# Patient Record
Sex: FEMALE | Race: WHITE
[De-identification: names, ages, dates, MRNs, and addresses within clinical notes are randomized per-mention and may not be internally consistent; named-entity substitution may affect disease eponyms.]

---

## 2019-11-01 ENCOUNTER — HOSPITAL ENCOUNTER (INPATIENT)
Dept: HOSPITAL 25 - SSU | Age: 79
LOS: 4 days | Discharge: NURSING, REHAB OR LONG-TERM CARE | DRG: 470 | End: 2019-11-05
Attending: INTERNAL MEDICINE | Admitting: INTERNAL MEDICINE
Payer: MEDICARE
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
## 2019-11-01 DIAGNOSIS — Z88.0: ICD-10-CM

## 2019-11-01 DIAGNOSIS — Z90.710: ICD-10-CM

## 2019-11-01 DIAGNOSIS — J44.9: ICD-10-CM

## 2019-11-01 DIAGNOSIS — Z90.722: ICD-10-CM
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
## 2019-11-01 DIAGNOSIS — Z66: ICD-10-CM

## 2019-11-01 DIAGNOSIS — Z87.891: ICD-10-CM

## 2019-11-01 DIAGNOSIS — S72.001A: Primary | ICD-10-CM

## 2019-11-01 DIAGNOSIS — I10: ICD-10-CM

## 2019-11-01 DIAGNOSIS — E87.6: ICD-10-CM

## 2019-11-01 DIAGNOSIS — Z88.2: ICD-10-CM

## 2019-11-01 DIAGNOSIS — D72.829: ICD-10-CM

## 2019-11-01 DIAGNOSIS — Z91.048: ICD-10-CM

## 2019-11-01 DIAGNOSIS — D64.9: ICD-10-CM

## 2019-11-01 PROCEDURE — 72170 X-RAY EXAM OF PELVIS: CPT

## 2019-11-01 PROCEDURE — 86901 BLOOD TYPING SEROLOGIC RH(D): CPT

## 2019-11-01 PROCEDURE — 81015 MICROSCOPIC EXAM OF URINE: CPT

## 2019-11-01 PROCEDURE — 88311 DECALCIFY TISSUE: CPT

## 2019-11-01 PROCEDURE — 85730 THROMBOPLASTIN TIME PARTIAL: CPT

## 2019-11-01 PROCEDURE — 85025 COMPLETE CBC W/AUTO DIFF WBC: CPT

## 2019-11-01 PROCEDURE — 85610 PROTHROMBIN TIME: CPT

## 2019-11-01 PROCEDURE — 87086 URINE CULTURE/COLONY COUNT: CPT

## 2019-11-01 PROCEDURE — 80048 BASIC METABOLIC PNL TOTAL CA: CPT

## 2019-11-01 PROCEDURE — 88305 TISSUE EXAM BY PATHOLOGIST: CPT

## 2019-11-01 PROCEDURE — 86900 BLOOD TYPING SEROLOGIC ABO: CPT

## 2019-11-01 PROCEDURE — 36415 COLL VENOUS BLD VENIPUNCTURE: CPT

## 2019-11-01 PROCEDURE — C1776 JOINT DEVICE (IMPLANTABLE): HCPCS

## 2019-11-01 PROCEDURE — 93005 ELECTROCARDIOGRAM TRACING: CPT

## 2019-11-01 PROCEDURE — 81003 URINALYSIS AUTO W/O SCOPE: CPT

## 2019-11-01 PROCEDURE — 86850 RBC ANTIBODY SCREEN: CPT

## 2019-11-02 LAB
ANION GAP SERPL CALC-SCNC: 7 MMOL/L (ref 2–11)
APTT PPP: 53.4 SECONDS (ref 26–38)
BASOPHILS # BLD AUTO: 0 10^3/UL (ref 0–0.2)
BUN SERPL-MCNC: 13 MG/DL (ref 6–24)
BUN/CREAT SERPL: 20.3 (ref 8–20)
CALCIUM SERPL-MCNC: 8.6 MG/DL (ref 8.6–10.3)
CHLORIDE SERPL-SCNC: 105 MMOL/L (ref 101–111)
EOSINOPHIL # BLD AUTO: 0 10^3/UL (ref 0–0.6)
GLUCOSE SERPL-MCNC: 76 MG/DL (ref 70–100)
HCO3 SERPL-SCNC: 25 MMOL/L (ref 22–32)
HCT VFR BLD AUTO: 30 % (ref 35–47)
HGB BLD-MCNC: 10.1 G/DL (ref 12–16)
INR PPP/BLD: 1.28 (ref 0.82–1.09)
LYMPHOCYTES # BLD AUTO: 1.5 10^3/UL (ref 1–4.8)
MCH RBC QN AUTO: 29 PG (ref 27–31)
MCHC RBC AUTO-ENTMCNC: 34 G/DL (ref 31–36)
MCV RBC AUTO: 87 FL (ref 80–97)
MONOCYTES # BLD AUTO: 0.9 10^3/UL (ref 0–0.8)
NEUTROPHILS # BLD AUTO: 8.7 10^3/UL (ref 1.5–7.7)
NRBC # BLD AUTO: 0 10^3/UL
NRBC BLD QL AUTO: 0
PLATELET # BLD AUTO: 357 10^3/UL (ref 150–450)
POTASSIUM SERPL-SCNC: 3.4 MMOL/L (ref 3.5–5)
RBC # BLD AUTO: 3.45 10^6 /UL (ref 3.7–4.87)
SODIUM SERPL-SCNC: 137 MMOL/L (ref 135–145)
WBC # BLD AUTO: 11.2 10^3/UL (ref 3.5–10.8)

## 2019-11-02 PROCEDURE — 0SRR01A REPLACEMENT OF RIGHT HIP JOINT, FEMORAL SURFACE WITH METAL SYNTHETIC SUBSTITUTE, UNCEMENTED, OPEN APPROACH: ICD-10-PCS | Performed by: ORTHOPAEDIC SURGERY

## 2019-11-02 RX ADMIN — SODIUM CHLORIDE SCH MLS/HR: 900 IRRIGANT IRRIGATION at 14:56

## 2019-11-02 RX ADMIN — ACETAMINOPHEN PRN MG: 325 TABLET ORAL at 14:58

## 2019-11-02 RX ADMIN — LISINOPRIL SCH MG: 5 TABLET ORAL at 18:06

## 2019-11-02 RX ADMIN — ACETAMINOPHEN PRN MG: 325 TABLET ORAL at 21:11

## 2019-11-02 RX ADMIN — CEFAZOLIN SODIUM SCH MLS/HR: 1 SOLUTION INTRAVENOUS at 18:07

## 2019-11-02 RX ADMIN — POLYETHYLENE GLYCOL 3350 SCH: 17 POWDER, FOR SOLUTION ORAL at 14:57

## 2019-11-02 RX ADMIN — DOCUSATE SODIUM SCH MG: 100 CAPSULE, LIQUID FILLED ORAL at 21:08

## 2019-11-02 RX ADMIN — SODIUM CHLORIDE SCH MLS/HR: 900 IRRIGANT IRRIGATION at 02:28

## 2019-11-02 NOTE — HP
HISTORY AND PHYSICAL:

 

DATE OF ADMISSION:  11/02/19

 

CHIEF COMPLAINT:  Fall with right hip pain.

 

HISTORY OF PRESENT ILLNESS:  This is a 79-year-old female with no past medical 
history other than some COPD, but currently not on any medication, was sent 
from [facility] after she was noted to have a right hip fracture.  The 
patient was in her usual state of health up until 11/01/19 evening when she 
slipped on the linoleum floor in her kitchen, landing on her right hip with an 
instant pain.  She was unable to stand up, so the grandson Vinny lifted her and 
put her in the couch, and later, he was able to bring her in the car and drive 
her to the [facility].  The patient was in severe pain ever since the 
fall.  She denied any head trauma or any loss of consciousness, any dizziness 
prior to the fall, any chest pain, shortness of breath, abdominal pain, nausea, 
vomiting, or diarrhea.  Her main complaint was the pain in the right hip.

 

PAST MEDICAL HISTORY:  She does have a history of COPD, but currently not on 
any medication.  According to the GARRY Tamayo at [facility], the 
patient has had chronic leukocytosis with her WBC always being in the 16,000 to 
18,000 range in all her previous blood work at [facility].

 

PAST SURGICAL HISTORY:  She has a history of exploratory laparotomy, unsure as 
to why it was done.  She has also had total abdominal hysterectomy with 
bilateral salpingo-oophorectomy.

 

HOME MEDICATIONS:  Currently, does not take any medications.  Was given some 
Aleve and Tylenol today after the fall.

 

ALLERGIES:  The patient is documented to be allergic to MERCURY, PENICILLIN, 
and SULFA ANTIBIOTICS which gives rash.

 

FAMILY HISTORY:  Given her age of 79, it is noncontributory.

 

SOCIAL HISTORY:  She quit smoking 20 years ago.  Prior to that, used to smoke 1 
to 2 cigarettes a day for 40 years.  Denies any alcohol or drug use.  Used to 
live independently, but recently moved in a few years ago at her grand kid's 
place.  She has otherwise already signed a DNR and states that her family would 
be the surrogate decision maker.  When asked to specify a name, she states her 
grandson Vinny would be able to make any decisions on her behalf.

 

                               PHYSICAL EXAMINATION

 

GENERAL:  The patient is awake, alert, and oriented x3, did not appear to be in 
any acute respiratory distress.

 

VITAL SIGNS:  Temperature 98.9, /79, heart rate 82, respiration rate 16, 
saturating 96% on room air.

 

HEAD AND NECK:  Atraumatic, normocephalic.  Extraocular movement intact.  Oral 
mucosa was dry.  Neck:  Supple.  No jugular venous distention.

 

LUNGS:  Clear to auscultation bilaterally.  No wheezing, rhonchi, or rales.

 

HEART:  S1, S2.  Regular rate and rhythm.

 

ABDOMEN:  Soft, nontender, nondistended.

 

EXTREMITIES:  The patient does have a shortened right lower extremity without 
any edema.

 

 DIAGNOSTIC STUDIES/LAB DATA:  CBC shows elevated white count as mentioned at 18
,000; hemoglobin shows hemoglobin of 11.8, minimally decreased; platelet count 
was noted to be 449.  LFTs within normal limits.  INR was noted to be 1.12.  
PTT was noted to be 39.2.  Basic metabolic panel unremarkable.  Random glucose 
was noted to be minimally elevated at 116.

 

Hip images showed right hip fracture, documented as a subcapital fracture with 
elevation of the shaft with respect to femoral head, no subluxation.  Left hip 
was showing no fracture.  A 2-view x-ray was read as no acute disease.  EKG 
showed sinus rhythm with voltage criteria for LVH, but this could be just 
because of the patient's body habitus being very skinny, no other ST elevations 
were noted.

 

IMPRESSION:

1.  This is a 79-year-old female with no significant past medical history 
except for some chronic leukocytosis, came in after sustaining a fall, noted to 
have right hip fracture.  Ortho to be consulted.  We will keep the patient 
n.p.o. for possible surgery.  The patient otherwise does not have any risk 
factors for preop clearance such as any previous myocardial infarctions, stroke
, high blood pressure, or diabetes, although the patient does have acutely 
elevated blood pressure.

2.  Elevated blood pressure.  Unclear if this is secondary to pain.  We will 
start the patient on analgesics and also p.r.n. hydralazine and oral amlodipine 
to control the blood pressure.

3.  DVT prophylaxis:  To be started once the patient is evaluated for surgery.

4.  Code status:  The patient is a DNR and suggests her grandson Vinny being the 
healthcare proxy.

 

 

 

[id number]/[facility] #: [id number]

[facility]

## 2019-11-02 NOTE — OP
OPERATIVE REPORT:

 

DATE OF OPERATION:  11/02/19

 

DATE OF BIRTH:  [birth year]

 

SURGEON:  Maximino English MD

 

ASSISTANT:  GARRY Dunn

 

ANESTHESIA:  General endotracheal.

 

PRE-OP DIAGNOSIS:  Displaced right femoral neck fracture.

 

POST-OP DIAGNOSIS:  Displaced right femoral neck fracture.

 

OPERATIVE PROCEDURE:  Right hip hemiarthroplasty.

 

INDICATIONS:  Ms. Branch is a 79-year-old female who had fallen at home last 
night. She initially was brought to [place] and then transferred here in the 
early a.m. hours.  She has a history of CLL and COPD, but otherwise had been 
declared healthy for surgery.  I discussed with her that a right hip 
hemiarthroplasty should work well, so she could put weight on this right away 
and not have to convalesce while a fracture tries to heal.  Risks of surgery 
such as infection, scar formation, stiffness, DVT, leg-length discrepancy, 
instability, hardware failure and continued pain were some of the risks 
discussed.  She had wished to proceed.

 

ESTIMATED BLOOD LOSS:  50 cc.

 

COMPLICATIONS:  None.

 

HARDWARE:  Brad #6 M/L taper, reduced neck with standard offset, -3.5 mm, 28 
mm femoral head, 44 mm bipolar head.

 

DESCRIPTION OF PROCEDURE:  The patient was brought to the OR and general 
endotracheal anesthesia was established.  She was then rolled on to her left 
lateral decubitus position and an axillary roll was placed.  Pegboard had been 
placed and the pegs were used to make sure she was safely secured to the table. 
Right hip area was prepped and then draped.  Skin over the incisional area was 
infiltrated using a 50:50 mixture of 0.5% Marcaine mixed with 1% lidocaine with 
epinephrine.  Incision was made, centered over the greater trochanter, 
extending distally for about 6 to 8 cm and proximally in a gentle curve for 
about 4 to 5 cm. It had been made so that when the hip was flexed up, incision 
would be in a straight line.  Incision was carried down through the skin and 
thin subcutaneous tissues.  Small bleeders encountered were ligated using 
electrocautery.  Fascia was exposed and sharply incised.  Short external 
rotators and abductors were immediately evident.  Hohmann was placed under the 
abductors and nice exposure of the piriformis and back side of the hip was 
obtained.  Electrocautery was used to take down the short external rotators and 
piriformis as well as open the capsule. Fracture was immediately evident.  
Guide was placed over the femur and the femoral neck was marked and then a 
cleanup cut was taken.  Femur was then shifted anteriorly and fracture of the 
head was clearly evident.  Corkscrew was placed and then used to leaver out the 
femoral head and the acetabulum was swept for additional loose bodies.  Pulse 
lavage was also extensively used.  Head slid perfectly through the 44 sizer and 
she was trialed with a 43, 44 and 45 size heads and I like the 44 the best.  
This corresponded well to pre-operative templating.  Attention was turned to 
the proximal femur.  Box osteotome was used to open the femoral canal and the 
canal finder was easily passed.  5 broelisa was seated and she was then trialed 
with a standard neck as well as with a minus head.  She seemed a bit long as 
she was very tight in extension.  Leg length wise, she seemed just about where 
she needed to be.  When the head was removed, the 5 was however just a little 
bit loose.  She was then trialed with a 6 and again I had the same concerns 
about her leg length.  The 6, however, did seem to fit better, but sat a little 
bit proud.  5 broach was then used to countersink a little bit and then the 6 
could be countersunk just a little bit more.  An additional 2 mm was obtained 
this way. She was again trialed and at this time, the broach did not loosen and 
a 6 reduced neck standard offset stem was called for.  This was then impacted 
into place.  -3.5 mm 28 mm head was then impacted in and the bipolar cup 
assembly was assembled on the top side of the head.  She was easily reduced and 
had excellent stability.  I was still a little concerned about the tightness 
she had with extension, but considering the center of rotation it appeared to 
correspond well to the tip of the greater trochanter and that we are unable to 
place any shorter implants, this should work well for her.  Hip was copiously 
pulse lavaged.  20 cc of local was injected on the superiorly and anteriorly 
about the hip as well.  Posterior capsule and posterior short external rotators 
and piriformis were all repaired together to the posterior aspect of the 
greater trochanter.  Hip was again pulse lavaged and fascia was repaired using 
interrupted #1 Vicryl sutures.  Subcutaneous tissues were reapproximated with 2-
0 Vicryl.  Skin was closed using staples.  Sterile dressing was applied in the 
OR.  The patient was rolled on to the hospital and extubated in the OR.  She 
was then stable on transfer to the recovery room.

 

 [id number]/[facility] #: [id number]

RENE

## 2019-11-02 NOTE — PN
Subjective


Date of Service: 11/02/19


Interval History: 





Patient c/o right hip pain 10/10 at time of evaluation and is quite calm during 

history taking. She states when she receives morphine it does help. She 

explains she has felt cold but denies chills and fevers. Denies chest pain, 

difficulty breathing, abd pain, nausea. 





Objective


Active Medications: 








Amlodipine Besylate (Norvasc Tab*)  5 mg PO DAILY [facility]


   Last Admin: 11/02/19 08:29 Dose:  Not Given


Hydralazine HCl (Apresoline Iv*)  5 mg IV SLOW PU Q6H PRN


   PRN Reason: Systolic Bp Greater Than:160


Sodium Chloride (Ns 0.9% 1000 Ml**)  1,000 mls @ 75 mls/hr IV PER RATE [facility]


   Last Admin: 11/02/19 02:28 Dose:  75 mls/hr


Morphine Sulfate (Morphine Inj (Syringe))*)  2 mg IV Q4H PRN


   PRN Reason: PAIN - MILD


   Last Admin: 11/02/19 02:37 Dose:  2 mg








 Vital Signs - 8 hr











  11/02/19 11/02/19 11/02/19





  02:37 03:38 03:47


 


Temperature  98.7 F 


 


Pulse Rate  76 


 


Respiratory 16 16 16





Rate   


 


Blood Pressure  149/75 





(mmHg)   


 


O2 Sat by Pulse  95 





Oximetry   














  11/02/19 11/02/19





  07:28 08:00


 


Temperature 99.2 F 


 


Pulse Rate 92 


 


Respiratory 16 16





Rate  


 


Blood Pressure 145/68 





(mmHg)  


 


O2 Sat by Pulse 94 





Oximetry  











Oxygen Devices in Use Now: None


Appearance: Thin, elderly white female, laying in bed, appearing comfortable 

and in NAD


Eyes: No Scleral Icterus, - - PERRL


Ears/Nose/Mouth/Throat: Mucous Membranes Moist


Neck: - - neck supple


Respiratory: Symmetrical Chest Expansion and Respiratory Effort, Clear to 

Auscultation


Cardiovascular: NL Sounds; No Murmurs; No JVD, RRR


Abdominal: - - abd soft, nontender, nondistended


Extremities: No Edema, No Clubbing, Cyanosis, - - no calf tenderness


Skin: No Rash or Ulcers


Neurological: Alert and Oriented x 3, - - sensation grossly intact and equal in 

bilateral LEs; able to flex/extend all toes and bilaterally dorsiflex/

plantarflex


Result Diagrams: 


 11/02/19 06:21





 11/02/19 06:21





Assess/Plan/Problems-Billing


Assessment: 





80 yo white female with PMHx COPD not on therapy at home presents to the 

[facility] ED right right hip pain s/p mechanical fall, then transferred 

to [facility] when found to have right hip fx.





- Patient Problems


(1) Hip fracture, right


Current Visit: Yes   Status: Acute   Code(s): S72.001A - FRACTURE OF UNSP PART 

OF NECK OF RIGHT FEMUR, INIT   SNOMED Code(s): 181824946


   Comment: 


-displaced right femoral neck fx demonstrated on x-rays at [place]


-Dr. English performed right hemiarthroplasty today. Appreciate ortho consult


-continue pain mgmt, bowel regimen   





(2) Hypertension


Current Visit: Yes   Status: Acute   Code(s): I10 - ESSENTIAL (PRIMARY) 

HYPERTENSION   SNOMED Code(s): 09953262


   Comment: 


-pt does not have pmhx HTN


-possibly related to pain as it was present in ED and PACU postoperatively


-did not receive amlodipine prior to surgery, will change to lisinopril and 

continue to monitor


-continue prn hydralazine for SBP>180   





(3) Hypokalemia


Current Visit: Yes   Status: Acute   Code(s): E87.6 - HYPOKALEMIA   SNOMED Code(

s): 80218380


   Comment: 


-likely 2/2 IVF due to NPO status for surgery


-replacing and will repeat BMP   





(4) COPD (chronic obstructive pulmonary disease)


Current Visit: Yes   Status: Acute   Code(s): J44.9 - CHRONIC OBSTRUCTIVE 

PULMONARY DISEASE, UNSPECIFIED   SNOMED Code(s): 97771599


   Comment: 


-no home meds


-prn albuterol ordered   





(5) Full code status


Current Visit: Yes   Status: Acute   Code(s): Z78.9 - OTHER SPECIFIED HEALTH 

STATUS   SNOMED Code(s): 345023547


   





(6) DVT prophylaxis


Current Visit: Yes   Status: Acute   Code(s): Z29.9 - ENCOUNTER FOR 

PROPHYLACTIC MEASURES, UNSPECIFIED   SNOMED Code(s): 766635813


   Comment: 


-lovenox per ortho

## 2019-11-02 NOTE — PN
Progress Note





- Progress Note


Date of Service: 11/02/19


SOAP: 


Subjective:  80 yo female, h/o COPD asnd CLL, fell at home last night.  Lives 

with her grandchildren.  Initially brought to [place], transferred here for 

definitive management.  Reports her hip hurts, but the pain medications have 

helped.  Other than the right hip, no other complaints.





Objective:  Elderly female, sleeping in bed.  Easily arousable.


VSS: afebrile.  HR, BPs good


CV: S1/S2


Lungs: clear


Right LE: shortened, but not particularly externally rotated.  Being propped by 

pillows though.  Motion not tested.


Good sensation over FDWS and dorum foot.


Can easily wiggle toes, slightly move ankle into plantar and dosriflexion





X-rays:  show a displaced right femoral neck fx





Assessment:  Displaced right femoral neck fx





Plan:  D/w patient a right hemiarthroplastty.  Risks of surgery such as bleeding

, considering her increased PT/INR, infection, scar formation, leg length 

discrepancy, instability, continued pain, DVT/PE and hardware failure, she 

wishes to proceed.  Added on for a right hip hemiarthroplasty later this AM.

## 2019-11-03 LAB
ANION GAP SERPL CALC-SCNC: 5 MMOL/L (ref 2–11)
BASOPHILS # BLD AUTO: 0 10^3/UL (ref 0–0.2)
BUN SERPL-MCNC: 11 MG/DL (ref 6–24)
BUN/CREAT SERPL: 14.1 (ref 8–20)
CALCIUM SERPL-MCNC: 8.9 MG/DL (ref 8.6–10.3)
CHLORIDE SERPL-SCNC: 105 MMOL/L (ref 101–111)
EOSINOPHIL # BLD AUTO: 0 10^3/UL (ref 0–0.6)
GLUCOSE SERPL-MCNC: 91 MG/DL (ref 70–100)
HCO3 SERPL-SCNC: 25 MMOL/L (ref 22–32)
HCT VFR BLD AUTO: 29 % (ref 35–47)
HGB BLD-MCNC: 9.8 G/DL (ref 12–16)
LYMPHOCYTES # BLD AUTO: 2 10^3/UL (ref 1–4.8)
MCH RBC QN AUTO: 30 PG (ref 27–31)
MCHC RBC AUTO-ENTMCNC: 34 G/DL (ref 31–36)
MCV RBC AUTO: 88 FL (ref 80–97)
MONOCYTES # BLD AUTO: 1.2 10^3/UL (ref 0–0.8)
NEUTROPHILS # BLD AUTO: 8.8 10^3/UL (ref 1.5–7.7)
NRBC # BLD AUTO: 0 10^3/UL
NRBC BLD QL AUTO: 0
PLATELET # BLD AUTO: 420 10^3/UL (ref 150–450)
POTASSIUM SERPL-SCNC: 4.1 MMOL/L (ref 3.5–5)
RBC # BLD AUTO: 3.31 10^6 /UL (ref 3.7–4.87)
SODIUM SERPL-SCNC: 135 MMOL/L (ref 135–145)
WBC # BLD AUTO: 12.2 10^3/UL (ref 3.5–10.8)
WBC UR QL AUTO: (no result)

## 2019-11-03 RX ADMIN — APIXABAN SCH MG: 2.5 TABLET, FILM COATED ORAL at 08:15

## 2019-11-03 RX ADMIN — POLYETHYLENE GLYCOL 3350 SCH: 17 POWDER, FOR SOLUTION ORAL at 08:15

## 2019-11-03 RX ADMIN — HYDROCODONE BITARTRATE AND ACETAMINOPHEN PRN TAB: 5; 325 TABLET ORAL at 08:15

## 2019-11-03 RX ADMIN — LISINOPRIL SCH MG: 5 TABLET ORAL at 08:14

## 2019-11-03 RX ADMIN — CEFAZOLIN SODIUM SCH MLS/HR: 1 SOLUTION INTRAVENOUS at 01:08

## 2019-11-03 RX ADMIN — CEFAZOLIN SODIUM SCH MLS/HR: 1 SOLUTION INTRAVENOUS at 10:21

## 2019-11-03 RX ADMIN — HYDROCODONE BITARTRATE AND ACETAMINOPHEN PRN TAB: 5; 325 TABLET ORAL at 12:56

## 2019-11-03 RX ADMIN — HYDROCODONE BITARTRATE AND ACETAMINOPHEN PRN TAB: 5; 325 TABLET ORAL at 17:36

## 2019-11-03 RX ADMIN — HYDROCODONE BITARTRATE AND ACETAMINOPHEN PRN TAB: 5; 325 TABLET ORAL at 00:16

## 2019-11-03 RX ADMIN — HYDROCODONE BITARTRATE AND ACETAMINOPHEN PRN TAB: 5; 325 TABLET ORAL at 03:57

## 2019-11-03 RX ADMIN — DOCUSATE SODIUM SCH MG: 100 CAPSULE, LIQUID FILLED ORAL at 21:42

## 2019-11-03 RX ADMIN — APIXABAN SCH MG: 2.5 TABLET, FILM COATED ORAL at 21:42

## 2019-11-03 RX ADMIN — DOCUSATE SODIUM SCH MG: 100 CAPSULE, LIQUID FILLED ORAL at 08:14

## 2019-11-03 RX ADMIN — HYDROCODONE BITARTRATE AND ACETAMINOPHEN PRN TAB: 5; 325 TABLET ORAL at 21:42

## 2019-11-03 NOTE — PN
Progress Note





- Progress Note


Date of Service: 11/03/19


SOAP: 


Subjective:


OOB to chair, moderate right hip pain when up, controlled with current pain meds





Objective:


 Vital Signs











Temp Pulse Resp BP Pulse Ox


 


 98.3 F   72   18   119/67   94 


 


 11/03/19 07:28  11/03/19 07:28  11/03/19 08:15  11/03/19 07:28  11/03/19 07:28








 Laboratory Last Values











WBC  12.2 10^3/uL (3.5-10.8)  H  11/03/19  05:52    


 


RBC  3.31 10^6 /uL (3.70-4.87)  L  11/03/19  05:52    


 


Hgb  9.8 g/dL (12.0-16.0)  L  11/03/19  05:52    


 


Hct  29 % (35-47)  L  11/03/19  05:52    


 


MCV  88 fL (80-97)   11/03/19  05:52    


 


MCH  30 pg (27-31)   11/03/19  05:52    


 


MCHC  34 g/dL (31-36)   11/03/19  05:52    


 


RDW  15 % (10-15)   11/03/19  05:52    


 


Plt Count  420 10^3/uL (150-450)   11/03/19  05:52    


 


MPV  7.8 fL (7.4-10.4)   11/03/19  05:52    


 


Neut % (Auto)  72.6 %  11/03/19  05:52    


 


Lymph % (Auto)  16.6 %  11/03/19  05:52    


 


Mono % (Auto)  10.2 %  11/03/19  05:52    


 


Eos % (Auto)  0.3 %  11/03/19  05:52    


 


Baso % (Auto)  0.3 %  11/03/19  05:52    


 


Absolute Neuts (auto)  8.8 10^3/ul (1.5-7.7)  H  11/03/19  05:52    


 


Absolute Lymphs (auto)  2.0 10^3/ul (1.0-4.8)   11/03/19  05:52    


 


Absolute Monos (auto)  1.2 10^3/ul (0-0.8)  H  11/03/19  05:52    


 


Absolute Eos (auto)  0.0 10^3/ul (0-0.6)   11/03/19  05:52    


 


Absolute Basos (auto)  0.0 10^3/ul (0-0.2)   11/03/19  05:52    


 


Absolute Nucleated RBC  0.0 10^3/ul  11/03/19  05:52    


 


Nucleated RBC %  0.0   11/03/19  05:52    


 


INR (Anticoag Therapy)  1.28  (0.82-1.09)  H  11/02/19  06:21    


 


APTT  53.4 seconds (26.0-38.0)  H  11/02/19  06:21    


 


Sodium  135 mmol/L (135-145)   11/03/19  05:52    


 


Potassium  4.1 mmol/L (3.5-5.0)   11/03/19  05:52    


 


Chloride  105 mmol/L (101-111)   11/03/19  05:52    


 


Carbon Dioxide  25 mmol/L (22-32)   11/03/19  05:52    


 


Anion Gap  5 mmol/L (2-11)   11/03/19  05:52    


 


BUN  11 mg/dL (6-24)   11/03/19  05:52    


 


Creatinine  0.78 mg/dL (0.51-0.95)   11/03/19  05:52    


 


Est GFR ( Amer)  86.4  (>60)   11/03/19  05:52    


 


Est GFR (Non-Af Amer)  71.4  (>60)   11/03/19  05:52    


 


BUN/Creatinine Ratio  14.1  (8-20)   11/03/19  05:52    


 


Glucose  91 mg/dL ()   11/03/19  05:52    


 


Calcium  8.9 mg/dL (8.6-10.3)   11/03/19  05:52    


 


Blood Type  A Positive   11/02/19  06:19    


 


Antibody Screen  Negative   11/02/19  06:19    








dressing c/d/i


PE: able to DF/PF, 2+ DP pulse, intact sensation








Assessment:


s/p right hip miranda; POD #1








Plan:


1) PT/OT- WBAT


2) Eliquis for DVT prophylaxis


3) Ancef for 24 hours post-op


4) May need rehab placement, will see how she progresses with PT

## 2019-11-03 NOTE — PN
Subjective


Date of Service: 11/03/19


Interval History: 





Ms. Branch c/o R hip pain rated at 6/10.  She has been up ambulating with walker, 

and notes that she did well, but it was painful.  She had a BM 2 days ago.  She 

denies new cough, CP, SOB, fever/chills, n/v/d.  She has no other complaints 

today.








Objective


Active Medications: 








Acetaminophen (Tylenol Tab*)  650 mg PO Q4H PRN


   PRN Reason: PAIN - MILD


   Last Admin: 11/02/19 21:11 Dose:  650 mg


Hydrocodone Bitart/Acetaminophen (Norco 5-325 Tab*)  2 tab PO Q4H PRN


   PRN Reason: PAIN - MODERATE


   Last Admin: 11/03/19 12:56 Dose:  2 tab


Albuterol (Ventolin Hfa Inhaler*)  1 puff INH Q4H PRN


   PRN Reason: SOB/WHEEZING


Apixaban (Eliquis*)  2.5 mg PO BID [facility]


   Last Admin: 11/03/19 08:15 Dose:  2.5 mg


Docusate Sodium (Colace Cap*)  100 mg PO BID [facility]


   Last Admin: 11/03/19 08:14 Dose:  100 mg


Hydralazine HCl (Apresoline Iv*)  5 mg IV SLOW PU Q6H PRN


   PRN Reason: Systolic Bp Greater Than:160


   Last Admin: 11/02/19 15:02 Dose:  5 mg


Sodium Chloride (Ns 0.9% 1000 Ml**)  1,000 mls @ 75 mls/hr IV PER RATE [facility]


   Last Admin: 11/02/19 14:56 Dose:  75 mls/hr


Lisinopril (Prinivil Tab*)  2.5 mg PO DAILY [facility]


   Last Admin: 11/03/19 08:14 Dose:  2.5 mg


Morphine Sulfate (Morphine Inj (Syringe))*)  2 mg IV Q2H PRN


   PRN Reason: PAIN - SEVERE


Ondansetron HCl (Zofran Inj*)  4 mg IV Q6H PRN


   PRN Reason: NAUSEA/VOMITING


Polyethylene Glycol/Electrolytes (Miralax*)  17 gm PO DAILY [facility]


   Last Admin: 11/03/19 08:15 Dose:  Not Given


Senna (Senokot 8.6 Mg Tab*)  1 tab PO DAILY PRN


   PRN Reason: CONSTIPATION








Vital Signs:











Temp Pulse Resp BP Pulse Ox


 


 98.3 F   82   16   107/54   94 


 


 11/03/19 12:00  11/03/19 12:00  11/03/19 17:27  11/03/19 12:00  11/03/19 16:00











Oxygen Devices in Use Now: None


Appearance: Ms. Branch is an older white female who is sitting in chair with LE at 

floor.  She appears mildly uncomfortable, but in no acute distress.


Eyes: No Scleral Icterus, PERRLA


Ears/Nose/Mouth/Throat: NL Teeth, Lips, Gums, Clear Oropharnyx, Mucous 

Membranes Moist


Neck: NL Appearance and Movements; NL JVP, Trachea Midline


Respiratory: Symmetrical Chest Expansion and Respiratory Effort, - - Decreased 

breath sounds throughout


Cardiovascular: NL Sounds; No Murmurs; No JVD, RRR, No Edema


Abdominal: NL Sounds; No Tenderness; No Distention, No Hepatosplenomegaly


Extremities: No Edema, No Clubbing, Cyanosis


Neurological: Alert and Oriented x 3


Result Diagrams: 


 11/03/19 05:52





 11/03/19 05:52





Assess/Plan/Problems-Billing


Assessment: 





78 yo white female with PMHx COPD not on therapy at home presents to the 

[facility] ED right right hip pain s/p mechanical fall, then transferred 

to [facility] when found to have right hip fx.








- Patient Problems


(1) Hip fracture, right


Comment: 


-displaced right femoral neck fx demonstrated on x-rays at [place]


-Dr. English performed right hemiarthroplasty 11/002


-appreciate ortho consult


-continue pain mgmt, bowel regimen


-PT/OT   





(2) Postoperative anemia


Comment: 


-pt with anemia, likely due to post-op status


-continue to monitor h/h   





(3) Hypertension


Comment: 


-pt does not have pmhx HTN


-possibly related to pain as it was present in ED and PACU postoperatively


-did not receive amlodipine prior to surgery, will change to lisinopril and 

continue to monitor


-BP under good control with lisinopril   





(4) Hypokalemia


Comment: 


-likely 2/2 IVF due to NPO status for surgery


-replaced and resolved


-continue to monitor   





(5) COPD (chronic obstructive pulmonary disease)


Comment: 


-no home meds


-prn albuterol ordered   





(6) DVT prophylaxis


Comment: 


-lovenox per ortho    





(7) Full code status





Status and Disposition: 





Inpatient.  Discharge when stable, home vs. [facility] to be determined.

## 2019-11-04 LAB
ANION GAP SERPL CALC-SCNC: 4 MMOL/L (ref 2–11)
BASOPHILS # BLD AUTO: 0.1 10^3/UL (ref 0–0.2)
BUN SERPL-MCNC: 16 MG/DL (ref 6–24)
BUN/CREAT SERPL: 18.4 (ref 8–20)
CALCIUM SERPL-MCNC: 9 MG/DL (ref 8.6–10.3)
CHLORIDE SERPL-SCNC: 106 MMOL/L (ref 101–111)
EOSINOPHIL # BLD AUTO: 0.3 10^3/UL (ref 0–0.6)
GLUCOSE SERPL-MCNC: 80 MG/DL (ref 70–100)
HCO3 SERPL-SCNC: 27 MMOL/L (ref 22–32)
HCT VFR BLD AUTO: 31 % (ref 35–47)
HGB BLD-MCNC: 10.2 G/DL (ref 12–16)
LYMPHOCYTES # BLD AUTO: 1.6 10^3/UL (ref 1–4.8)
MCH RBC QN AUTO: 29 PG (ref 27–31)
MCHC RBC AUTO-ENTMCNC: 33 G/DL (ref 31–36)
MCV RBC AUTO: 88 FL (ref 80–97)
MONOCYTES # BLD AUTO: 1 10^3/UL (ref 0–0.8)
NEUTROPHILS # BLD AUTO: 8.7 10^3/UL (ref 1.5–7.7)
NRBC # BLD AUTO: 0 10^3/UL
NRBC BLD QL AUTO: 0
PLATELET # BLD AUTO: 435 10^3/UL (ref 150–450)
POTASSIUM SERPL-SCNC: 4.5 MMOL/L (ref 3.5–5)
RBC # BLD AUTO: 3.47 10^6 /UL (ref 3.7–4.87)
SODIUM SERPL-SCNC: 137 MMOL/L (ref 135–145)
WBC # BLD AUTO: 11.6 10^3/UL (ref 3.5–10.8)

## 2019-11-04 RX ADMIN — APIXABAN SCH MG: 2.5 TABLET, FILM COATED ORAL at 08:03

## 2019-11-04 RX ADMIN — LISINOPRIL SCH MG: 5 TABLET ORAL at 08:03

## 2019-11-04 RX ADMIN — HYDROCODONE BITARTRATE AND ACETAMINOPHEN PRN TAB: 5; 325 TABLET ORAL at 12:01

## 2019-11-04 RX ADMIN — DOCUSATE SODIUM SCH: 100 CAPSULE, LIQUID FILLED ORAL at 08:07

## 2019-11-04 RX ADMIN — DOCUSATE SODIUM SCH MG: 100 CAPSULE, LIQUID FILLED ORAL at 20:23

## 2019-11-04 RX ADMIN — HYDROCODONE BITARTRATE AND ACETAMINOPHEN PRN TAB: 5; 325 TABLET ORAL at 08:03

## 2019-11-04 RX ADMIN — APIXABAN SCH MG: 2.5 TABLET, FILM COATED ORAL at 20:23

## 2019-11-04 RX ADMIN — POLYETHYLENE GLYCOL 3350 SCH: 17 POWDER, FOR SOLUTION ORAL at 08:07

## 2019-11-04 RX ADMIN — HYDROCODONE BITARTRATE AND ACETAMINOPHEN PRN TAB: 5; 325 TABLET ORAL at 22:04

## 2019-11-04 RX ADMIN — HYDROCODONE BITARTRATE AND ACETAMINOPHEN PRN TAB: 5; 325 TABLET ORAL at 02:39

## 2019-11-04 RX ADMIN — HYDROCODONE BITARTRATE AND ACETAMINOPHEN PRN TAB: 5; 325 TABLET ORAL at 17:44

## 2019-11-04 NOTE — PN
Progress Note





- Progress Note


Date of Service: 11/04/19


SOAP: 


Subjective:


[]Pt seen at bedside. She feels well, hip pain is well controlled. She ambulate 

to the restroom yesterday with PT. Denies CP, SOB, dizziness, nausea. 








Objective:


[]Gen: NAD


RLE: Right hip dressing changed, incision CDI no erythema and no discharge. 

Thigh is soft. DF/PF intact, DP2+, sensation intact to light touch distlaly


calvrs supple and nontender





Assessment:


s/p right hip miranda; POD #2








Plan:


1) PT/OT- WBAT. Posterior hip precautions


2) Eliquis 2.5 mg po BID x 30 days post op for DVT prophylaxis


3) Ancef for 24 hours post-op


4) May need rehab placement, will see how she progresses with PT








 Vital Signs











Temp  98.4 F   11/04/19 07:42


 


Pulse  87   11/04/19 08:00


 


Resp  18   11/04/19 08:03


 


BP  142/79   11/04/19 07:42


 


Pulse Ox  94   11/04/19 08:00








 Intake & Output











 11/03/19 11/04/19 11/04/19





 18:59 06:59 18:59


 


Intake Total 540 0 360


 


Output Total 300 505 100


 


Balance 240 -505 260


 


Intake:   


 


  Oral 540 0 360


 


Output:   


 


  Urine 300 505 100


 


Other:   


 


  Estimated Void Large  


 


  # Voids 1  








 Laboratory Last Values











WBC  11.6 10^3/uL (3.5-10.8)  H  11/04/19  06:41    


 


RBC  3.47 10^6 /uL (3.70-4.87)  L  11/04/19  06:41    


 


Hgb  10.2 g/dL (12.0-16.0)  L  11/04/19  06:41    


 


Hct  31 % (35-47)  L  11/04/19  06:41    


 


MCV  88 fL (80-97)   11/04/19  06:41    


 


MCH  29 pg (27-31)   11/04/19  06:41    


 


MCHC  33 g/dL (31-36)   11/04/19  06:41    


 


RDW  15 % (10-15)   11/04/19  06:41    


 


Plt Count  435 10^3/uL (150-450)   11/04/19  06:41    


 


MPV  7.3 fL (7.4-10.4)  L  11/04/19  06:41    


 


Neut % (Auto)  74.7 %  11/04/19  06:41    


 


Lymph % (Auto)  14.0 %  11/04/19  06:41    


 


Mono % (Auto)  8.4 %  11/04/19  06:41    


 


Eos % (Auto)  2.2 %  11/04/19  06:41    


 


Baso % (Auto)  0.7 %  11/04/19  06:41    


 


Absolute Neuts (auto)  8.7 10^3/ul (1.5-7.7)  H  11/04/19  06:41    


 


Absolute Lymphs (auto)  1.6 10^3/ul (1.0-4.8)   11/04/19  06:41    


 


Absolute Monos (auto)  1.0 10^3/ul (0-0.8)  H  11/04/19  06:41    


 


Absolute Eos (auto)  0.3 10^3/ul (0-0.6)   11/04/19  06:41    


 


Absolute Basos (auto)  0.1 10^3/ul (0-0.2)   11/04/19  06:41    


 


Absolute Nucleated RBC  0.0 10^3/ul  11/04/19  06:41    


 


Nucleated RBC %  0.0   11/04/19  06:41    


 


INR (Anticoag Therapy)  1.28  (0.82-1.09)  H  11/02/19  06:21    


 


APTT  53.4 seconds (26.0-38.0)  H  11/02/19  06:21    


 


Sodium  137 mmol/L (135-145)   11/04/19  06:41    


 


Potassium  4.5 mmol/L (3.5-5.0)   11/04/19  06:41    


 


Chloride  106 mmol/L (101-111)   11/04/19  06:41    


 


Carbon Dioxide  27 mmol/L (22-32)   11/04/19  06:41    


 


Anion Gap  4 mmol/L (2-11)   11/04/19  06:41    


 


BUN  16 mg/dL (6-24)   11/04/19  06:41    


 


Creatinine  0.87 mg/dL (0.51-0.95)   11/04/19  06:41    


 


Est GFR ( Amer)  76.2  (>60)   11/04/19  06:41    


 


Est GFR (Non-Af Amer)  63.0  (>60)   11/04/19  06:41    


 


BUN/Creatinine Ratio  18.4  (8-20)   11/04/19  06:41    


 


Glucose  80 mg/dL ()   11/04/19  06:41    


 


Calcium  9.0 mg/dL (8.6-10.3)   11/04/19  06:41    


 


Urine Color  Yellow   11/03/19  21:41    


 


Urine Appearance  Cloudy   11/03/19  21:41    


 


Urine pH  5.0  (5-9)   11/03/19  21:41    


 


Ur Specific Gravity  1.021  (1.010-1.030)   11/03/19  21:41    


 


Urine Protein  Negative  (Negative)   11/03/19  21:41    


 


Urine Ketones  Negative  (Negative)   11/03/19  21:41    


 


Urine Blood  Negative  (Negative)   11/03/19  21:41    


 


Urine Nitrate  Negative  (Negative)   11/03/19  21:41    


 


Urine Bilirubin  Negative  (Negative)   11/03/19  21:41    


 


Urine Urobilinogen  Negative  (Negative)   11/03/19  21:41    


 


Ur Leukocyte Esterase  Trace  (Negative)  A  11/03/19  21:41    


 


Urine WBC (Auto)  1+(6-10/hpf)  (Absent)  A  11/03/19  21:41    


 


Urine RBC (Auto)  Absent  (Absent)   11/03/19  21:41    


 


Ur Squamous Epith Cells  Present  (Absent)  A  11/03/19  21:41    


 


Urine Bacteria  Absent  (Absent)   11/03/19  21:41    


 


Urine Glucose  Negative  (Negative)   11/03/19  21:41    


 


Blood Type  A Positive   11/02/19  06:19    


 


Antibody Screen  Negative   11/02/19  06:19

## 2019-11-04 NOTE — PN
Subjective


Date of Service: 11/04/19


Interval History: 





Ms. Branch continues to have R hip pain that she rates at 6/10 currently.  She 

notes it is worse with ambulation, and she has just returned from the bathroom 

recently.  She reports last BM 11/01.  She has no other complaints today.








Objective


Active Medications: 








Acetaminophen (Tylenol Tab*)  650 mg PO Q4H PRN


   PRN Reason: PAIN - MILD


   Last Admin: 11/02/19 21:11 Dose:  650 mg


Hydrocodone Bitart/Acetaminophen (Norco 5-325 Tab*)  2 tab PO Q4H PRN


   PRN Reason: PAIN - MODERATE


   Last Admin: 11/04/19 12:01 Dose:  2 tab


Albuterol (Ventolin Hfa Inhaler*)  1 puff INH Q4H PRN


   PRN Reason: SOB/WHEEZING


Apixaban (Eliquis*)  2.5 mg PO BID [facility]


   Last Admin: 11/04/19 08:03 Dose:  2.5 mg


Docusate Sodium (Colace Cap*)  100 mg PO BID [facility]


   Last Admin: 11/04/19 08:07 Dose:  Not Given


Hydralazine HCl (Apresoline Iv*)  5 mg IV SLOW PU Q6H PRN


   PRN Reason: Systolic Bp Greater Than:160


   Last Admin: 11/02/19 15:02 Dose:  5 mg


Sodium Chloride (Ns 0.9% 1000 Ml**)  1,000 mls @ 75 mls/hr IV PER RATE [facility]


   Last Admin: 11/02/19 14:56 Dose:  75 mls/hr


Lisinopril (Prinivil Tab*)  2.5 mg PO DAILY [facility]


   Last Admin: 11/04/19 08:03 Dose:  2.5 mg


Magnesium Hydroxide (Milk Of Magnesia Liq*)  30 ml PO BID PRN


   PRN Reason: CONSTIPATION


Morphine Sulfate (Morphine Inj (Syringe))*)  2 mg IV Q2H PRN


   PRN Reason: PAIN - SEVERE


Ondansetron HCl (Zofran Inj*)  4 mg IV Q6H PRN


   PRN Reason: NAUSEA/VOMITING


Polyethylene Glycol/Electrolytes (Miralax*)  17 gm PO DAILY [facility]


   Last Admin: 11/04/19 08:07 Dose:  Not Given


Senna (Senokot 8.6 Mg Tab*)  1 tab PO DAILY PRN


   PRN Reason: CONSTIPATION








Vital Signs:











Temp Pulse Resp BP Pulse Ox


 


 98.0 F   95   18   131/74   99 


 


 11/04/19 15:20  11/04/19 15:20  11/04/19 15:20  11/04/19 15:20  11/04/19 16:00











Oxygen Devices in Use Now: None


Appearance: Ms. Branch is an older white female who appears older than stated age; 

she is sitting up in chair with LE elevated.  She appears to be in no acute 

distress, but is midly uncomfortable.


Eyes: No Scleral Icterus, PERRLA


Ears/Nose/Mouth/Throat: NL Teeth, Lips, Gums, Clear Oropharnyx, Mucous 

Membranes Moist


Neck: NL Appearance and Movements; NL JVP, Trachea Midline


Respiratory: Symmetrical Chest Expansion and Respiratory Effort, - - Scattered 

faint, intermittent wheezing; decreased air exchange


Cardiovascular: NL Sounds; No Murmurs; No JVD, RRR, No Edema


Abdominal: NL Sounds; No Tenderness; No Distention, No Hepatosplenomegaly


Extremities: No Edema, No Clubbing, Cyanosis, - - R hip with CDI dressing in 

place; able to move all extremities


Neurological: Alert and Oriented x 3, NL Sensation


Result Diagrams: 


 11/04/19 06:41





 11/04/19 06:41


Microbiology and Other Data: 


 Microbiology











 11/03/19 21:41 Urine Culture - Final





 Urine    No Growth (<1,000 CFU/mL)














Assess/Plan/Problems-Billing


Assessment: 





78 yo white female with PMHx COPD not on therapy at home presents to the 

[facility] ED right right hip pain s/p mechanical fall, then transferred 

to [facility] when found to have right hip fx.








- Patient Problems


(1) Hip fracture, right


Comment: 


-displaced right femoral neck fx demonstrated on x-rays at [place]


-Dr. English performed right hemiarthroplasty 11/02


-appreciate ortho consult


-continue pain mgmt, bowel regimen


-PT/OT   





(2) Postoperative anemia


Comment: 


-pt with anemia, likely due to post-op status


-trending up


-continue to monitor h/h   





(3) Hypertension


Comment: 


-BP well controlled


-no PMHx HTN


-possibly related to pain as it was present in ED and PACU postoperatively


-BP under good control with lisinopril   





(4) COPD (chronic obstructive pulmonary disease)


Comment: 


-very mild wheezing on exam; does not appear to be in exacerbation


-no home meds


-prn albuterol ordered   





(5) DVT prophylaxis


Comment: 


-Eliquis 2.5 PO BID per ortho   





(6) Full code status





Status and Disposition: 





Inpatient.  Discharge when stable, home vs. LADI to be determined.

## 2019-11-05 ENCOUNTER — HOSPITAL ENCOUNTER (INPATIENT)
Dept: HOSPITAL 25 - PMRU | Age: 79
LOS: 8 days | Discharge: HOME | DRG: 561 | End: 2019-11-13
Attending: PHYSICAL MEDICINE & REHABILITATION | Admitting: PHYSICAL MEDICINE & REHABILITATION
Payer: MEDICARE

## 2019-11-05 VITALS — DIASTOLIC BLOOD PRESSURE: 63 MMHG | SYSTOLIC BLOOD PRESSURE: 116 MMHG
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
## 2019-11-05 DIAGNOSIS — Z47.1: Primary | ICD-10-CM

## 2019-11-05 DIAGNOSIS — Z66: ICD-10-CM

## 2019-11-05 DIAGNOSIS — Z88.0: ICD-10-CM

## 2019-11-05 DIAGNOSIS — Z79.899: ICD-10-CM

## 2019-11-05 DIAGNOSIS — Z96.641: ICD-10-CM

## 2019-11-05 DIAGNOSIS — I10: ICD-10-CM

## 2019-11-05 DIAGNOSIS — F03.90: ICD-10-CM

## 2019-11-05 DIAGNOSIS — R05: ICD-10-CM

## 2019-11-05 DIAGNOSIS — J44.9: ICD-10-CM

## 2019-11-05 DIAGNOSIS — S72.91XD: ICD-10-CM

## 2019-11-05 DIAGNOSIS — Z88.2: ICD-10-CM

## 2019-11-05 LAB
ANION GAP SERPL CALC-SCNC: 5 MMOL/L (ref 2–11)
BASOPHILS # BLD AUTO: 0.1 10^3/UL (ref 0–0.2)
BUN SERPL-MCNC: 16 MG/DL (ref 6–24)
BUN/CREAT SERPL: 21.6 (ref 8–20)
CALCIUM SERPL-MCNC: 9.1 MG/DL (ref 8.6–10.3)
CHLORIDE SERPL-SCNC: 103 MMOL/L (ref 101–111)
EOSINOPHIL # BLD AUTO: 0.3 10^3/UL (ref 0–0.6)
GLUCOSE SERPL-MCNC: 80 MG/DL (ref 70–100)
HCO3 SERPL-SCNC: 28 MMOL/L (ref 22–32)
HCT VFR BLD AUTO: 31 % (ref 35–47)
HGB BLD-MCNC: 10.3 G/DL (ref 12–16)
LYMPHOCYTES # BLD AUTO: 1.5 10^3/UL (ref 1–4.8)
MCH RBC QN AUTO: 29 PG (ref 27–31)
MCHC RBC AUTO-ENTMCNC: 33 G/DL (ref 31–36)
MCV RBC AUTO: 88 FL (ref 80–97)
MONOCYTES # BLD AUTO: 0.9 10^3/UL (ref 0–0.8)
NEUTROPHILS # BLD AUTO: 8.8 10^3/UL (ref 1.5–7.7)
NRBC # BLD AUTO: 0 10^3/UL
NRBC BLD QL AUTO: 0
PLATELET # BLD AUTO: 463 10^3/UL (ref 150–450)
POTASSIUM SERPL-SCNC: 4.5 MMOL/L (ref 3.5–5)
RBC # BLD AUTO: 3.56 10^6 /UL (ref 3.7–4.87)
SODIUM SERPL-SCNC: 136 MMOL/L (ref 135–145)
WBC # BLD AUTO: 11.6 10^3/UL (ref 3.5–10.8)

## 2019-11-05 PROCEDURE — F07Z8ZZ TRANSFER TRAINING TREATMENT: ICD-10-PCS | Performed by: PHYSICAL MEDICINE & REHABILITATION

## 2019-11-05 PROCEDURE — F08Z0ZZ BATHING/SHOWERING TECHNIQUES TREATMENT: ICD-10-PCS | Performed by: PHYSICAL MEDICINE & REHABILITATION

## 2019-11-05 PROCEDURE — 81003 URINALYSIS AUTO W/O SCOPE: CPT

## 2019-11-05 PROCEDURE — 36415 COLL VENOUS BLD VENIPUNCTURE: CPT

## 2019-11-05 PROCEDURE — F08Z3ZZ FEEDING/EATING TREATMENT: ICD-10-PCS | Performed by: PHYSICAL MEDICINE & REHABILITATION

## 2019-11-05 PROCEDURE — F07Z4ZZ WHEELCHAIR MOBILITY TREATMENT: ICD-10-PCS | Performed by: PHYSICAL MEDICINE & REHABILITATION

## 2019-11-05 PROCEDURE — F08Z2ZZ GROOMING/PERSONAL HYGIENE TREATMENT: ICD-10-PCS | Performed by: PHYSICAL MEDICINE & REHABILITATION

## 2019-11-05 PROCEDURE — F08Z1ZZ DRESSING TECHNIQUES TREATMENT: ICD-10-PCS | Performed by: PHYSICAL MEDICINE & REHABILITATION

## 2019-11-05 PROCEDURE — F07Z5ZZ BED MOBILITY TREATMENT: ICD-10-PCS | Performed by: PHYSICAL MEDICINE & REHABILITATION

## 2019-11-05 PROCEDURE — 80053 COMPREHEN METABOLIC PANEL: CPT

## 2019-11-05 PROCEDURE — F07Z9ZZ GAIT TRAINING/FUNCTIONAL AMBULATION TREATMENT: ICD-10-PCS | Performed by: PHYSICAL MEDICINE & REHABILITATION

## 2019-11-05 PROCEDURE — 85025 COMPLETE CBC W/AUTO DIFF WBC: CPT

## 2019-11-05 RX ADMIN — LISINOPRIL SCH MG: 5 TABLET ORAL at 08:24

## 2019-11-05 RX ADMIN — HYDROCODONE BITARTRATE AND ACETAMINOPHEN PRN TAB: 5; 325 TABLET ORAL at 05:01

## 2019-11-05 RX ADMIN — HYDROCODONE BITARTRATE AND ACETAMINOPHEN PRN TAB: 5; 325 TABLET ORAL at 13:18

## 2019-11-05 RX ADMIN — APIXABAN SCH MG: 2.5 TABLET, FILM COATED ORAL at 19:47

## 2019-11-05 RX ADMIN — DOCUSATE SODIUM SCH MG: 100 CAPSULE, LIQUID FILLED ORAL at 19:51

## 2019-11-05 RX ADMIN — POLYETHYLENE GLYCOL 3350 SCH: 17 POWDER, FOR SOLUTION ORAL at 09:37

## 2019-11-05 RX ADMIN — DOCUSATE SODIUM SCH MG: 100 CAPSULE, LIQUID FILLED ORAL at 08:25

## 2019-11-05 RX ADMIN — APIXABAN SCH MG: 2.5 TABLET, FILM COATED ORAL at 08:25

## 2019-11-05 RX ADMIN — HYDROCODONE BITARTRATE AND ACETAMINOPHEN PRN TAB: 5; 325 TABLET ORAL at 23:41

## 2019-11-05 RX ADMIN — SENNOSIDES PRN TAB: 8.6 TABLET, FILM COATED ORAL at 19:51

## 2019-11-05 RX ADMIN — HYDROCODONE BITARTRATE AND ACETAMINOPHEN PRN TAB: 5; 325 TABLET ORAL at 18:57

## 2019-11-05 NOTE — HP
ADMISSION HISTORY AND PHYSICAL:

 

DATE OF ADMISSION:  11/05/19

 

REASON FOR ADMISSION:  Right hip fracture.

 

HISTORY OF PRESENT ILLNESS:  Yolanda Branch is a 79-year-old female.  She lives in 
a trailer with her grandson.  On 11/01/19, the patient slipped on her kitchen 
floor and fell to the ground.  She was unable to get up.  Her grandson, Vinny 
was there and lifted her up on to the couch.  She was unable to walk after 
resting for a bit and so he brought her to the hospital at [facility] in 
[place].  She was having severe hip pain.  At the [facility], the 
patient had x-rays taken which showed a left hip fracture.  She was transferred 
to [facility] and admitted to the hospital.  She was seen by Dr. English, who took her to the operating room on 11/02/19 and she had a right hip 
hemiarthroplasty. Postoperatively, she was started on Eliquis for DVT 
prophylaxis.  She was noted to have an elevated blood pressure.  She was 
started on lisinopril.  She was also put on p.r.n. albuterol for her COPD.  The 
patient was noted to have postop anemia as well as an elevated white blood cell 
count.  She did not require a transfusion. She was felt to have Physical 
Therapy and Occupational Therapy needs.  She is now being admitted for 
inpatient rehab so she may return to independent living.

 

PAST MEDICAL HISTORY:  Significant for COPD, although she was taking no 
medicines prior to admission.  She likely had hypertension as well but was not 
treated.  She had no seen a primary care doctor in some time.

 

PAST SURGICAL HISTORY:  She has had a hysterectomy in the past as well.

 

CURRENT MEDICATIONS:  Include:

1.  Eliquis for DVT prophylaxis.

2.  She is on hydrocodone for pain control.

3.  Lisinopril.

4.  Bowel medications.

 

ALLERGIES:  Include PENICILLIN and SULFA.

 

SOCIAL HISTORY:  She is a nonsmoker, rare drinker. She lives with her grandson 
in a trailer. They live in [place].

 

REVIEW OF SYSTEMS:  The patient reports no current shortness of breath or chest 
pain.

 

                               PHYSICAL EXAMINATION

 

VITAL SIGNS:  The patient's temperature is 98.9, blood pressure is 116/73, 
pulse 97, respirations 18.

 

HEENT:  Extraocular movements are intact.  Tongue is midline.

 

NECK:  Supple.

 

LUNGS:  Lung sounded clear to auscultation bilaterally.

 

HEART:  Heart sounds are regular.  S1, S2 audible.

 

ABDOMEN:  Soft and nontender.

 

EXTREMITIES:  Her right hip has a wound which is clean and dry.  Peripheral 
pulses are intact.

 

NEUROLOGIC:  Sensation appeared to be intact.  Muscle strength is 5/5 on both 
upper and lower extremities, except the right lower extremity which is 3/5 
secondary to pain.

 

FUNCTIONAL EXAM:  She transfers with contact guard to min assist.

 

 ASSESSMENT:  Right hip fracture status post hemiarthroplasty of the same.

 

PLAN:  Our plan is to integrate her into a comprehensive therapeutic rehab 
program with the following goals:

1.  Physical Therapy will work with the patient.  They are going to work on 
functional transfer training, ambulation training with a walker.

2.  Occasional Therapy will see the patient, work on activities of daily living 
including toileting and toilet transfers.

3.  Eliquis for DVT prophylaxis.

4.  Adequate analgesia.

5.  Her bowel will be regulated.

6.   will be closely involved to make sure that any services and 
equipment that the patient requires are in place to prior discharge.

7.  Advance directives:  The patient had requested DNR status and I will put 
DNR order in the chart.

8.  We will continue lisinopril for her blood pressure for now, but we will 
watch her blood pressure to see if she needs it.

9.  Family training as appropriate.

10.  Home with appropriate services.

 

ESTIMATED LENGTH OF STAY:  Eight days.

 

 

 

[id number]/CPS #: [id number]

RENE

## 2019-11-05 NOTE — PMRUTEAM
PMRU: Team Meeting


Current Status: 


 Physical Therapy: Current Status











Current Rolling Status         Not attempted


 


Current Supine <-> Sit Status  Partial/Moderate


 


Current Sit <-> Stand Status   Partial/Moderate


 


Current Bed <-> Chair Status   Partial/Moderate


 


Transfer/Bed Mobility          Rolling Walker





Recommended Devices            


 


Current Car Transfer Status    Not attempted due to


 


Current Ambulation Assistance  Partial/Moderate





Status                         


 


Ambulation Assistive Device    Rolling Walker


 


Ambulation Conditions          Two or More Turns,Uneven Surfaces


 


Current Ambulation Distance    50', 25', 15'


 


Current Wheelchair Propulsion  Not Applicable





Ability Status                 


 


Current Stair Climbing Status  Partial/Moderate


 


Stair Climbing Assistive       Left Railing,Right Railing





Devices                        


 


Number of Stairs Climbed       3


 


Current Curb Assistance Status Partial/Moderate


 


Curb Assistive Devices         Rolling Walker


 


Objective Comments             Pt is able to recall 1/3 Total Hip Precautions -





 crossing legs














 Occupational Therapy: Current Status











Current Upper Body Dressing    Supervision/Touching





Status                         


 


Current Lower Body Dressing    Patient Refused





Status                         


 


Current Footwear Status        Dependent


 


Current Bathing Status         Substantial/Maximal


 


Current Grooming Status        Setup or Clean-up Assist


 


Current Toileting Status       Supervision/Touching


 


Current Toilet Transfer Status Supervision/Touching


 


Current Eating Status          Independent

















Goals: 


 Physical Therapy:  Goals











Goals to Be Accomplished in (  5-7





Days)                          


 


Goal: Rolling Assistance       Independent


 


Goal Supine <-> Sit Status     Independent


 


Goal Sit <-> Stand Status      Independent


 


Goal Bed <-> Chair Status      Independent


 


Transfer/Bed Mobility          Rolling Walker





Recommended Devices            


 


Goal: Picking Up Object        Independent


 


Goal: Car Transfer Status      Setup or Clean-up Assist


 


Goal: Ambulation Assistance    Independent


 


Ambulation Assistive Devices   Rolling Walker


 


Ambulation Distance (ft)       150


 


Goal: Wheelchair Propulsion    Not Applicable





Ability                        


 


Goal: Stairs Assistance        Supervision/Touching


 


Stairs Recommended Devices     One Rail


 


Number of Stairs               4


 


Goal: Curb Assistance          Supervision/Touching


 


Goal: Home Exercise Program    Supervision/Touching





Assistance                     














 Occupational Therapy: Goals











Goals to be Completed in (Days 7-10





)                              


 


Goal Upper Body Dressing       Setup or Clean-up Assist





Routine                        


 


Goal Lower Body Dressing       Supervision/Touching





Routine                        


 


Goal Footwear Status           Supervision/Touching


 


Goal Bathing Routine (OT)      Supervision/Touching


 


Goal Grooming Routine          Setup or Clean-up Assist


 


Goal Toilet Hygiene and        Supervision/Touching





Clothing Management Routine    


 


Goal Toilet Transfer Routine   Supervision/Touching


 


Goal Functional Transfers for  Supervision/Touching





ADL                            


 


Goal Feeding Routine           Independent


 


Goal Light Housekeeping Tasks  Partial/Moderate

















Care Plan: 


 Care Plan





ADL's - Improve/Maintain                Start:  11/05/19 11:58              


Freq:   DAILY@0700,1900                 Status: Active      Target: 11/06/19


Protocol:                                                                   








Activity Type Activity Date Activity User E-Sign Co-Sign Detail





Recorded Client Recorded Date Recorded By   


 


Document 11/05/19 11:59 [id number]   





[id number] 11/05/19 11:59 [id number]   














  11/05/19





  11:59


 


PMRU Outcome: ADL's/ADL Transfers 


 


Orders/Interventions Occupational





 Therapy





 Evaluation &





 Treatment





 Communication





 Tool in Patient





 Room


 


Device Yes


 


Address Deficits Secondary To: right hip





 hemiarthroplast





 y


 


Patient to receive OT 5x/wk for  Therex





min/day Self Care





 Management





 Group Therapy


 


UE/LE ADL's with Assist Yes:





 supervision/





 touching A


 


ADL Transfers with Assist Yes:





 supervision/





 touching A


 


Toileting: Transfers,Clothing Management Yes:





,Hygeine w/Assist supervision/





 touching A


 


Light Kitchen/Laundry w/Assist Yes: partial/





 modA


 


Other Outcome/Goals Pt is a 79 year





 old female s/p





 fall with R





 hip fx s/p





 hemiarthroplast





 y, pt able to





 recall only 1/3





 precautions





 which is





 consistent





 since acute OT





 eval which was





 completed last





 date. Pt with c





 /o pain and





 feeling lousy





 and cold





 throughout





 session, able





 to participate





 with assistance





 and limited





 due to pain and





 not feeling





 well throughout





 , LPN aware of





 pt's complaints





 , awaiting





 orders from MD





 to be entered





 in order for pt





 to receive





 pain meds. Pt





 currently with





 significantly





 limited ADL





 status with LE





 dressing and





 bathing due to





 hip precautions





 which pt





 cannot recall





 at this time,





 pt confused at





 times during





 evaluation and





 requires cues





 for safe hand





 placement and





 to maintain hip





 precautions





 which limits





 independence





 with bathing,





 dressing,





 toileting, and





 transfers. Pt





 will benefit





 from skilled OT





 intervention





 and will likely





 require





 supervision at





 d/c if





 confusion and





 difficulty with





 recall of hip





 precautions





 continues.


 


Progression Toward Outcome/Goals Goal Initiation

















- Interdisciplinary Staff Present


/Social Work Staff Present: Tasneem Lujan LMSW


Nursing Staff Present: Alesia Nieto, MARTHA


OT Staff Present: Shanta Doty


PT Staff Present: Farhana Delatorre


Rec Therapy Staff Present: Yina Pulido


Medicine Note: 








Length of Stay:  8 days





Anticipated Discharge Destination: Home





Tentative Discharge Date:  November 13, 2019





Discharged to:  Home

## 2019-11-05 NOTE — DS
DATE OF ADMISSION:  11/02/2019.

 

DATE OF DISCHARGE:  11/05/2019.

 

PRIMARY CARE PHYSICIAN:  None; please establish with [facility] 
prior to discharge.

 

OTHER PROVIDER:  Dr. Maximino English.

 

ATTENDING PHYSICIAN:  Dr. Tara Herzog * (dictated by GARRY Garvey).

 

PRIMARY DIAGNOSES:

1.  Right femur fracture, status post right hemiarthroplasty.

2.  Postoperative anemia.

3.  Hypertension.

4.  Hypokalemia.

 

SECONDARY DIAGNOSES:

1.  COPD, not on home medications.

2.  History of chronic leukocytosis.

 

CONSULTATIONS WHILE IN THE HOSPITAL:  Orthopedic consultation:  Plan:  Discuss 
with patient a right hemiarthroplasty; risks of surgery such as bleeding, 
considering her increased PT/INR, infection, scar formation, leg length 
discrepancy, instability, continued pain, DVT/PE, and hardware failure, she 
wishes to proceed. Added on for a right hip hemiarthroplasty later this a.m.

 

STUDIES WHILE IN THE HOSPITAL:  Pelvis x-ray:  Report and impression:  Right 
hip bipolar hemiprosthesis in place with normal alignment in the AP projection.
  No periprosthetic fracture evident within limits of AP exam.  Overlying soft 
tissue edema, subcutaneous emphysema, and cutaneous staples.

 

HOME MEDICATIONS:  None.

 

DISCHARGE MEDICATIONS:

1.  Acetaminophen 650 mg p.o. q.4 hours prn.

2.  Albuterol HFA inhaler one puff inhalation q.4 hours prn.

3.  Apixaban 2.5 mg p.o. b.i.d. times 30 days postop.

4.  Docusate 100 mg p.o. b.i.d. prn.

5.  Hydrocodone/acetaminophen 5/325 one to two tabs p.o. q.4 hours prn pain.

6.  Lisinopril 2.5 mg p.o. daily.

7.  Magnesium Hydroxide 30 ml p.o. b.i.d. prn.

8.  Polyethylene Glycol 17 gm p.o. daily prn.

9.  Senna one tab p.o. daily prn.

 

HISTORY OF PRESENT ILLNESS/HOSPITAL COURSE:  Ms. Branch is a 79-year-old female 
with a past medical history of COPD, not on medications who presented to 
[facility] on November 2nd and was noted to have a right femur fracture, 
status post mechanical fall.  For full and complete details, please see the 
history and physical dictated by Dr. Heber Gunderson.  In short, the patient 
presented to [place] with these symptoms.  She was transferred to [facility].  
Orthopedics was consulted.  The patient was offered a right hip 
hemiarthroplasty which she accepted.  This was performed on 11/02/2019.  
Throughout her stay she worked with Physical Therapy who recommended subacute 
rehab.  [facility] has accepted the patient for admission and she will be transferred 
to [facility] today.

 

At admission, the patient was noted to be hypertensive.  She does not have an 
outpatient diagnosis of hypertension.  She was started on low dose Lisinopril 
2.5 mg p.o. daily which has adequately controlled her blood pressure.  She will 
be continued on this medication as an outpatient.

 

The patient was noted to have mild hypokalemia during hospitalization.  This 
was likely from IV fluids.  This was repleted and her potassium was within 
normal limits throughout the rest of her stay.  The patient was also noted to 
have some mild anemia throughout her stay.  This was likely partially due to 
perioperative blood loss.  The patient's hemoglobin and hematocrit are trending 
upward at discharge.  Recommendations are to repeat a CBC and BMP in 
approximately one week to ensure that these continue to rise.  Recommend anemia 
work-up if H and H remains low.  The patient was also noted to have a mild 
leukocytosis throughout her stay. Reports from [place] state that the patient 
has chronic leukocytosis with white blood cells ranging from 16,000 to 18,000 
in all previous blood work from [facility]. She does not have a primary care 
provider, therefore she should have an appointment scheduled with [facility] at discharge.  Recommendations are to have follow-up lab 
work addressed by her new primary care provider.

 

At the time of discharge, the patient continues to have 7/10 pain in the right 
hip. She notes that pain has worsened when she is up and walking, and she 
recently returned from the bathroom causing an increase in pain.  She denies 
dizziness, lightheadedness, loss of consciousness, shortness of breath.  She 
denies cough, fever, chills, nausea, vomiting, diarrhea, abdominal pain, dysuria
, frequency, urgency, retention.  She has not had a bowel movement in 
approximately four days, but denies abdominal pain.

 

REVIEW OF SYMPTOMS:  A 14 point review of systems has been performed and all 
the pertinent positives and negatives are in the HPI.  All other systems are 
negative.

 

PHYSICAL EXAMINATION:  General: Ms. Branch is a well-developed, well-nourished, 
thin, older, white woman who appears somewhat older than her stated age.  She 
appears mildly uncomfortable, but is resting in a recliner chair with the lower 
extremities elevated.  No acute distress. Vital Signs:  Temperature 98.3 oral, 
heart rate 93, respiratory rate 16, oxygen saturation 96 percent on room air, 
blood pressure 116/63.  HEENT:  PERRL, EOMI, nonicteric sclerae.  Hearing 
grossly intact.  Oral mucous membranes are moist, there are no lesions.  
Pharynx is clear without exudates.  Tongue is at midline.  Palate elevated 
symmetrically.  Cardiovascular: Regular rate and rhythm with S1, S2 present 
without murmurs, rubs, clicks, or gallops.  There is no JVD.  There is no 
peripheral edema.  Radial and pedal pulses are palpable. Pulmonary:  
Symmetrical chest expansion without use of accessory muscles.  Clear to 
auscultation bilaterally without rhonchi, wheezes, or rubs. Mildly diminished 
breath sounds throughout.  No digital clubbing or cyanosis. Abdomen:  Flat.  
Mildly distended.  Soft, nontender to palpation.  Musculoskeletal: Full range 
of motion in bilateral upper extremities and left lower extremity.  Full active 
range of motion in right lower extremity, painful movement at the right hip. 
Neuro:  The patient is awake.  She is alert and oriented times three with 
cranial nerves grossly intact.  Sensation intact distally.

 

DISCHARGE PLAN:  Ms. Branch is stable for discharge to [facility].

 

MEDICATIONS:

1.  Continue Lisinopril 2.5 mg p.o. daily for hypertension.

2.  Eliquis 2.5 mg p.o. b.i.d. times 30 days postoperatively.

3.  Continue pain regimen.

4.  Continue bowel regimen, especially while on opiate pain medications.

 

CONDITION ON DISCHARGE:  Fair.

 

DIET:  Resume home diet.

 

ACTIVITY:  Weightbearing as tolerated, posterior hip precautions.

 

EDUCATION:

1.  Repeat CBC, BMP in five to seven days.

2.  You do not have a primary care provider, please ensure that you have an 
appointment with [facility] within four to seven days of discharge 
from [facility].

3.  Follow-up with Orthopedics as scheduled.

4.  Return to the ER or nearest hospital if you experience any worsening of 
symptoms, erythema, edema, drainage from right hip incision site, chest pain or 
discomfort, shortness of breath, dizziness, lightheadedness, loss of 
consciousness, high fevers, chills, night sweats or any other worrisome signs 
or symptoms.

 

This is a summarized report of a complex medical history and hospital stay.  
For further details, please see the entire medical record.

 

TIME SPENT:  Approximately 35 minutes were spent on this discharge, greater 
than half of that time was spent face-to-face with the patient discussing 
discharge plans and instructions.

 

____________________________________ 

GARRY YU

 

[id number]/CPS #: [id number]

[id number]

## 2019-11-06 LAB
ALBUMIN SERPL BCG-MCNC: 2.8 G/DL (ref 3.2–5.2)
ALBUMIN/GLOB SERPL: 1.1 {RATIO} (ref 1–3)
ALP SERPL-CCNC: 66 U/L (ref 34–104)
ALT SERPL W P-5'-P-CCNC: 7 U/L (ref 7–52)
ANION GAP SERPL CALC-SCNC: 2 MMOL/L (ref 2–11)
AST SERPL-CCNC: 14 U/L (ref 13–39)
BASOPHILS # BLD AUTO: 0.1 10^3/UL (ref 0–0.2)
BUN SERPL-MCNC: 13 MG/DL (ref 6–24)
BUN/CREAT SERPL: 18.8 (ref 8–20)
CALCIUM SERPL-MCNC: 9.2 MG/DL (ref 8.6–10.3)
CHLORIDE SERPL-SCNC: 103 MMOL/L (ref 101–111)
EOSINOPHIL # BLD AUTO: 0.3 10^3/UL (ref 0–0.6)
GLOBULIN SER CALC-MCNC: 2.6 G/DL (ref 2–4)
GLUCOSE SERPL-MCNC: 89 MG/DL (ref 70–100)
HCO3 SERPL-SCNC: 30 MMOL/L (ref 22–32)
HCT VFR BLD AUTO: 29 % (ref 35–47)
HGB BLD-MCNC: 9.6 G/DL (ref 12–16)
LYMPHOCYTES # BLD AUTO: 1.3 10^3/UL (ref 1–4.8)
MCH RBC QN AUTO: 29 PG (ref 27–31)
MCHC RBC AUTO-ENTMCNC: 33 G/DL (ref 31–36)
MCV RBC AUTO: 88 FL (ref 80–97)
MONOCYTES # BLD AUTO: 0.8 10^3/UL (ref 0–0.8)
NEUTROPHILS # BLD AUTO: 7.1 10^3/UL (ref 1.5–7.7)
NRBC # BLD AUTO: 0 10^3/UL
NRBC BLD QL AUTO: 0
PLATELET # BLD AUTO: 449 10^3/UL (ref 150–450)
POTASSIUM SERPL-SCNC: 4.3 MMOL/L (ref 3.5–5)
PROT SERPL-MCNC: 5.4 G/DL (ref 6.4–8.9)
RBC # BLD AUTO: 3.29 10^6 /UL (ref 3.7–4.87)
SODIUM SERPL-SCNC: 135 MMOL/L (ref 135–145)
WBC # BLD AUTO: 9.5 10^3/UL (ref 3.5–10.8)

## 2019-11-06 RX ADMIN — HYDROCODONE BITARTRATE AND ACETAMINOPHEN PRN TAB: 5; 325 TABLET ORAL at 16:38

## 2019-11-06 RX ADMIN — APIXABAN SCH MG: 2.5 TABLET, FILM COATED ORAL at 19:54

## 2019-11-06 RX ADMIN — DOCUSATE SODIUM SCH MG: 100 CAPSULE, LIQUID FILLED ORAL at 08:40

## 2019-11-06 RX ADMIN — HYDROCODONE BITARTRATE AND ACETAMINOPHEN PRN TAB: 5; 325 TABLET ORAL at 20:43

## 2019-11-06 RX ADMIN — DOCUSATE SODIUM SCH MG: 100 CAPSULE, LIQUID FILLED ORAL at 19:54

## 2019-11-06 RX ADMIN — HYDROCODONE BITARTRATE AND ACETAMINOPHEN PRN TAB: 5; 325 TABLET ORAL at 08:42

## 2019-11-06 RX ADMIN — APIXABAN SCH MG: 2.5 TABLET, FILM COATED ORAL at 08:40

## 2019-11-06 RX ADMIN — LISINOPRIL SCH MG: 5 TABLET ORAL at 08:45

## 2019-11-06 RX ADMIN — SENNOSIDES PRN TAB: 8.6 TABLET, FILM COATED ORAL at 19:54

## 2019-11-06 RX ADMIN — HYDROCODONE BITARTRATE AND ACETAMINOPHEN PRN TAB: 5; 325 TABLET ORAL at 05:00

## 2019-11-06 NOTE — PN
Progress Note


Date of Service: 11/06/19


Note: 


KATIE COON was visited. Therapy notes read and reviewed. She is moving 

fairly well but has a little difficulty with motivation. Otherwise no 

complaints other than right hip pain.








Current Medications: 


 Active Medications











Generic Name Dose Route Start Last Admin





  Trade Name Freq  PRN Reason Stop Dose Admin


 


Acetaminophen  650 mg  11/05/19 11:44  





  Tylenol Tab*  PO   





  Q6H PRN   





  MILD PAIN or TEMP > 100.4   





     





     





     


 


Hydrocodone Bitart/Acetaminophen  1 tab  11/05/19 11:54  11/06/19 16:38





  Norco 5-325 Tab*  PO   1 tab





  Q4H PRN   Administration





  PAIN - MODERATE   





     





     





     


 


Hydrocodone Bitart/Acetaminophen  2 tab  11/05/19 11:55  11/06/19 08:42





  Norco 5-325 Tab*  PO   2 tab





  Q4H PRN   Administration





  PAIN - SEVERE   





     





     





     


 


Albuterol  2 puff  11/05/19 17:09  





  Ventolin Hfa Inhaler*  INH   





  Q6H PRN   





  SOB/WHEEZING   





     





     





     


 


Apixaban  2.5 mg  11/05/19 21:00  11/06/19 08:40





  Eliquis*  PO   2.5 mg





  BID DENIS   Administration





     





     





     





     


 


Docusate Sodium  100 mg  11/05/19 21:00  11/06/19 08:40





  Colace Cap*  PO   100 mg





  BID DENIS   Administration





     





     





     





     


 


Lisinopril  2.5 mg  11/06/19 09:00  11/06/19 08:45





  Prinivil Tab*  PO   2.5 mg





  DAILY DENIS   Administration





     





     





     





     


 


Magnesium Hydroxide  30 ml  11/05/19 11:44  





  Milk Of Magnesia Liq*  PO   





  Q6H PRN   





  CONSTIPATION   





     





     





     


 


Polyethylene Glycol/Electrolytes  17 gm  11/05/19 11:55  11/06/19 12:42





  Miralax*  PO   17 gm





  DAILY PRN   Administration





  CONSTIPATION   





     





     





     


 


Senna  2 tab  11/05/19 11:44  11/05/19 19:51





  Senokot 8.6 Mg Tab*  PO   2 tab





  BEDTIME PRN   Administration





  CONSTIPATION   





     





     





     











Vital Signs: 


 Vital Signs











Temp Pulse Resp BP Pulse Ox


 


 97.9 F   103   20   128/71   96 


 


 11/06/19 16:38  11/06/19 16:38  11/06/19 16:38  11/06/19 16:38  11/06/19 17:31











Lab Results: 


 Laboratory Results - last 24 hr











  11/06/19 11/06/19





  05:44 05:44


 


WBC  9.5 


 


RBC  3.29 L 


 


Hgb  9.6 L 


 


Hct  29 L 


 


MCV  88 


 


MCH  29 


 


MCHC  33 


 


RDW  15 


 


Plt Count  449 


 


MPV  7.2 L 


 


Neut % (Auto)  74.6 


 


Lymph % (Auto)  13.3 


 


Mono % (Auto)  8.0 


 


Eos % (Auto)  3.3 


 


Baso % (Auto)  0.8 


 


Absolute Neuts (auto)  7.1 


 


Absolute Lymphs (auto)  1.3 


 


Absolute Monos (auto)  0.8 


 


Absolute Eos (auto)  0.3 


 


Absolute Basos (auto)  0.1 


 


Absolute Nucleated RBC  0.0 


 


Nucleated RBC %  0.0 


 


Sodium   135


 


Potassium   4.3


 


Chloride   103


 


Carbon Dioxide   30


 


Anion Gap   2


 


BUN   13


 


Creatinine   0.69


 


Est GFR ( Amer)   99.6


 


Est GFR (Non-Af Amer)   82.3


 


BUN/Creatinine Ratio   18.8


 


Glucose   89


 


Calcium   9.2


 


Total Bilirubin   0.30


 


AST   14


 


ALT   7


 


Alkaline Phosphatase   66


 


Total Protein   5.4 L


 


Albumin   2.8 L


 


Globulin   2.6


 


Albumin/Globulin Ratio   1.1











Exam: 





GENERAL: A&O. No distress


LUNGS: Clear bilaterally


HEART: Regular rhythm


ABDOMEN: Soft


EXTREMITIES: Right hip wound C/D/I


NEUROLOGIC: Sensation intact. Moves all 4 extremities with 5/5 except RLE





Assessment/Plan: 





1. Right Hip Fracture, S/P Hemiarthroplasty: WBAT. PT/OT. Follow up with Dr. English


2. COPD: Albuterol HFA


3, DVT Prophylaxis: Eliquis


4. HTN: Lisinopril


5. Dementia: Will have MOCA


6. Advance Directives: DNR








11/06/19 18:17

## 2019-11-07 RX ADMIN — HYDROCODONE BITARTRATE AND ACETAMINOPHEN PRN TAB: 5; 325 TABLET ORAL at 19:07

## 2019-11-07 RX ADMIN — DOCUSATE SODIUM SCH MG: 100 CAPSULE, LIQUID FILLED ORAL at 20:45

## 2019-11-07 RX ADMIN — DOCUSATE SODIUM SCH MG: 100 CAPSULE, LIQUID FILLED ORAL at 09:32

## 2019-11-07 RX ADMIN — HYDROCODONE BITARTRATE AND ACETAMINOPHEN PRN TAB: 5; 325 TABLET ORAL at 05:48

## 2019-11-07 RX ADMIN — LISINOPRIL SCH MG: 5 TABLET ORAL at 09:32

## 2019-11-07 RX ADMIN — SENNOSIDES PRN TAB: 8.6 TABLET, FILM COATED ORAL at 20:44

## 2019-11-07 RX ADMIN — HYDROCODONE BITARTRATE AND ACETAMINOPHEN PRN TAB: 5; 325 TABLET ORAL at 09:32

## 2019-11-07 RX ADMIN — HYDROCODONE BITARTRATE AND ACETAMINOPHEN PRN TAB: 5; 325 TABLET ORAL at 00:47

## 2019-11-07 RX ADMIN — GUAIFENESIN PRN ML: 100 SOLUTION ORAL at 20:45

## 2019-11-07 RX ADMIN — APIXABAN SCH MG: 2.5 TABLET, FILM COATED ORAL at 09:37

## 2019-11-07 RX ADMIN — HYDROCODONE BITARTRATE AND ACETAMINOPHEN PRN TAB: 5; 325 TABLET ORAL at 11:10

## 2019-11-07 RX ADMIN — HYDROCODONE BITARTRATE AND ACETAMINOPHEN PRN TAB: 5; 325 TABLET ORAL at 23:57

## 2019-11-07 RX ADMIN — APIXABAN SCH MG: 2.5 TABLET, FILM COATED ORAL at 20:44

## 2019-11-07 NOTE — PN
Progress Note


Date of Service: 11/07/19


Note: 


KATIE COON was visited. Therapy notes read and reviewed. She was doing 

fairly well but has difficulty remembering how to do things. Trouble using 

adaptive equipment. Has a cough








Current Medications: 


 Active Medications











Generic Name Dose Route Start Last Admin





  Trade Name Freq  PRN Reason Stop Dose Admin


 


Acetaminophen  650 mg  11/05/19 11:44  





  Tylenol Tab*  PO   





  Q6H PRN   





  MILD PAIN or TEMP > 100.4   





     





     





     


 


Hydrocodone Bitart/Acetaminophen  1 tab  11/05/19 11:54  11/07/19 11:10





  Norco 5-325 Tab*  PO   1 tab





  Q4H PRN   Administration





  PAIN - MODERATE   





     





     





     


 


Hydrocodone Bitart/Acetaminophen  2 tab  11/05/19 11:55  11/07/19 19:07





  Norco 5-325 Tab*  PO   2 tab





  Q4H PRN   Administration





  PAIN - SEVERE   





     





     





     


 


Albuterol  2 puff  11/05/19 17:09  





  Ventolin Hfa Inhaler*  INH   





  Q6H PRN   





  SOB/WHEEZING   





     





     





     


 


Apixaban  2.5 mg  11/05/19 21:00  11/07/19 09:37





  Eliquis*  PO   2.5 mg





  BID DENIS   Administration





     





     





     





     


 


Docusate Sodium  100 mg  11/05/19 21:00  11/07/19 09:32





  Colace Cap*  PO   100 mg





  BID DENIS   Administration





     





     





     





     


 


Lisinopril  2.5 mg  11/06/19 09:00  11/07/19 09:32





  Prinivil Tab*  PO   2.5 mg





  DAILY DENIS   Administration





     





     





     





     


 


Magnesium Hydroxide  30 ml  11/05/19 11:44  





  Milk Of Magnesia Liq*  PO   





  Q6H PRN   





  CONSTIPATION   





     





     





     


 


Polyethylene Glycol/Electrolytes  17 gm  11/05/19 11:55  11/06/19 12:42





  Miralax*  PO   17 gm





  DAILY PRN   Administration





  CONSTIPATION   





     





     





     


 


Senna  2 tab  11/05/19 11:44  11/06/19 19:54





  Senokot 8.6 Mg Tab*  PO   2 tab





  BEDTIME PRN   Administration





  CONSTIPATION   





     





     





     











Vital Signs: 


 Vital Signs











Temp Pulse Resp BP Pulse Ox


 


 99 F   96   18   102/69   94 


 


 11/07/19 16:00  11/07/19 16:00  11/07/19 19:07  11/07/19 16:00  11/07/19 17:30











Exam: 





GENERAL: A&O. No distress


LUNGS: Scattered ronchi


HEART: Regular rhythm


ABDOMEN: Soft


EXTREMITIES: Right hip wound C/D/I


NEUROLOGIC: Sensation intact. Moves all 4 extremities with 5/5 except RLE


Assessment/Plan: 





1. Right Hip Fracture, S/P Hemiarthroplasty: WBAT. PT/OT. Follow up with Dr. English


2. COPD: Albuterol HFA


3, DVT Prophylaxis: Eliquis


4. HTN: Lisinopril


5. Dementia: MOCA 8/30


6. Advance Directives: DNR


7. Cough: Robitussin, may need CXR











11/07/19 19:26

## 2019-11-08 RX ADMIN — HYDROCODONE BITARTRATE AND ACETAMINOPHEN PRN TAB: 5; 325 TABLET ORAL at 09:38

## 2019-11-08 RX ADMIN — APIXABAN SCH MG: 2.5 TABLET, FILM COATED ORAL at 19:51

## 2019-11-08 RX ADMIN — LISINOPRIL SCH MG: 5 TABLET ORAL at 08:00

## 2019-11-08 RX ADMIN — GUAIFENESIN PRN ML: 100 SOLUTION ORAL at 08:01

## 2019-11-08 RX ADMIN — HYDROCODONE BITARTRATE AND ACETAMINOPHEN PRN TAB: 5; 325 TABLET ORAL at 15:47

## 2019-11-08 RX ADMIN — HYDROCODONE BITARTRATE AND ACETAMINOPHEN PRN TAB: 5; 325 TABLET ORAL at 05:37

## 2019-11-08 RX ADMIN — DOCUSATE SODIUM SCH MG: 100 CAPSULE, LIQUID FILLED ORAL at 19:51

## 2019-11-08 RX ADMIN — GUAIFENESIN PRN ML: 100 SOLUTION ORAL at 19:51

## 2019-11-08 RX ADMIN — DOCUSATE SODIUM SCH MG: 100 CAPSULE, LIQUID FILLED ORAL at 08:00

## 2019-11-08 RX ADMIN — APIXABAN SCH MG: 2.5 TABLET, FILM COATED ORAL at 08:00

## 2019-11-08 NOTE — PN
Progress Note


Date of Service: 11/08/19


Note: 


KATIE COON was visited. Therapy notes read and reviewed. She is doing fairly 

well but STM is a big problem. Her cough slightly better after Robitussin








Current Medications: 


 Active Medications











Generic Name Dose Route Start Last Admin





  Trade Name Freq  PRN Reason Stop Dose Admin


 


Acetaminophen  650 mg  11/05/19 11:44  





  Tylenol Tab*  PO   





  Q6H PRN   





  MILD PAIN or TEMP > 100.4   





     





     





     


 


Hydrocodone Bitart/Acetaminophen  1 tab  11/05/19 11:54  11/07/19 11:10





  Norco 5-325 Tab*  PO   1 tab





  Q4H PRN   Administration





  PAIN - MODERATE   





     





     





     


 


Hydrocodone Bitart/Acetaminophen  2 tab  11/05/19 11:55  11/08/19 15:47





  Norco 5-325 Tab*  PO   2 tab





  Q4H PRN   Administration





  PAIN - SEVERE   





     





     





     


 


Albuterol  2 puff  11/05/19 17:09  





  Ventolin Hfa Inhaler*  INH   





  Q6H PRN   





  SOB/WHEEZING   





     





     





     


 


Apixaban  2.5 mg  11/05/19 21:00  11/08/19 08:00





  Eliquis*  PO   2.5 mg





  BID DENIS   Administration





     





     





     





     


 


Docusate Sodium  100 mg  11/05/19 21:00  11/08/19 08:00





  Colace Cap*  PO   100 mg





  BID DENIS   Administration





     





     





     





     


 


Guaifenesin  5 ml  11/07/19 19:30  11/08/19 08:01





  Robitussin*  PO   5 ml





  Q6H PRN   Administration





  COUGH   





     





     





     


 


Lisinopril  2.5 mg  11/06/19 09:00  11/08/19 08:00





  Prinivil Tab*  PO   2.5 mg





  DAILY DENIS   Administration





     





     





     





     


 


Magnesium Hydroxide  30 ml  11/05/19 11:44  





  Milk Of Magnesia Liq*  PO   





  Q6H PRN   





  CONSTIPATION   





     





     





     


 


Polyethylene Glycol/Electrolytes  17 gm  11/05/19 11:55  11/06/19 12:42





  Miralax*  PO   17 gm





  DAILY PRN   Administration





  CONSTIPATION   





     





     





     


 


Senna  2 tab  11/05/19 11:44  11/07/19 20:44





  Senokot 8.6 Mg Tab*  PO   2 tab





  BEDTIME PRN   Administration





  CONSTIPATION   





     





     





     











Vital Signs: 


 Vital Signs











Temp Pulse Resp BP Pulse Ox


 


 98.3 F   99   22   135/72   95 


 


 11/08/19 15:58  11/08/19 15:58  11/08/19 15:58  11/08/19 15:58  11/08/19 15:58











Exam: 





GENERAL: A&O. No distress


LUNGS: Scattered ronchi


HEART: Regular rhythm


ABDOMEN: Soft


EXTREMITIES: Right hip wound C/D/I


NEUROLOGIC: Sensation intact. Moves all 4 extremities with 5/5 except RLE


Assessment/Plan: 





1. Right Hip Fracture, S/P Hemiarthroplasty: WBAT. PT/OT. Follow up with Dr. English


2. COPD: Albuterol HFA


3, DVT Prophylaxis: Eliquis


4. HTN: Lisinopril


5. Dementia: MOCA 8/30


6. Advance Directives: DNR


7. Cough: Robitussin, may need CXR














11/08/19 16:49

## 2019-11-09 LAB
BASOPHILS # BLD AUTO: 0.1 10^3/UL (ref 0–0.2)
EOSINOPHIL # BLD AUTO: 0.2 10^3/UL (ref 0–0.6)
HCT VFR BLD AUTO: 30 % (ref 35–47)
HGB BLD-MCNC: 9.8 G/DL (ref 12–16)
LYMPHOCYTES # BLD AUTO: 1.3 10^3/UL (ref 1–4.8)
MCH RBC QN AUTO: 29 PG (ref 27–31)
MCHC RBC AUTO-ENTMCNC: 33 G/DL (ref 31–36)
MCV RBC AUTO: 88 FL (ref 80–97)
MONOCYTES # BLD AUTO: 0.6 10^3/UL (ref 0–0.8)
NEUTROPHILS # BLD AUTO: 4.3 10^3/UL (ref 1.5–7.7)
NRBC # BLD AUTO: 0 10^3/UL
NRBC BLD QL AUTO: 0
PLATELET # BLD AUTO: 512 10^3/UL (ref 150–450)
RBC # BLD AUTO: 3.37 10^6 /UL (ref 3.7–4.87)
WBC # BLD AUTO: 6.5 10^3/UL (ref 3.5–10.8)

## 2019-11-09 RX ADMIN — APIXABAN SCH MG: 2.5 TABLET, FILM COATED ORAL at 08:38

## 2019-11-09 RX ADMIN — HYDROCODONE BITARTRATE AND ACETAMINOPHEN PRN TAB: 5; 325 TABLET ORAL at 00:03

## 2019-11-09 RX ADMIN — HYDROCODONE BITARTRATE AND ACETAMINOPHEN PRN TAB: 5; 325 TABLET ORAL at 10:04

## 2019-11-09 RX ADMIN — GUAIFENESIN PRN ML: 100 SOLUTION ORAL at 08:41

## 2019-11-09 RX ADMIN — LISINOPRIL SCH MG: 5 TABLET ORAL at 08:39

## 2019-11-09 RX ADMIN — DOCUSATE SODIUM SCH MG: 100 CAPSULE, LIQUID FILLED ORAL at 19:31

## 2019-11-09 RX ADMIN — HYDROCODONE BITARTRATE AND ACETAMINOPHEN PRN TAB: 5; 325 TABLET ORAL at 05:34

## 2019-11-09 RX ADMIN — HYDROCODONE BITARTRATE AND ACETAMINOPHEN PRN TAB: 5; 325 TABLET ORAL at 17:46

## 2019-11-09 RX ADMIN — HYDROCODONE BITARTRATE AND ACETAMINOPHEN PRN TAB: 5; 325 TABLET ORAL at 22:32

## 2019-11-09 RX ADMIN — DOCUSATE SODIUM SCH MG: 100 CAPSULE, LIQUID FILLED ORAL at 08:38

## 2019-11-09 RX ADMIN — APIXABAN SCH MG: 2.5 TABLET, FILM COATED ORAL at 19:31

## 2019-11-10 RX ADMIN — APIXABAN SCH MG: 2.5 TABLET, FILM COATED ORAL at 08:08

## 2019-11-10 RX ADMIN — HYDROCODONE BITARTRATE AND ACETAMINOPHEN PRN TAB: 5; 325 TABLET ORAL at 08:09

## 2019-11-10 RX ADMIN — HYDROCODONE BITARTRATE AND ACETAMINOPHEN PRN TAB: 5; 325 TABLET ORAL at 13:01

## 2019-11-10 RX ADMIN — DOCUSATE SODIUM SCH MG: 100 CAPSULE, LIQUID FILLED ORAL at 19:49

## 2019-11-10 RX ADMIN — LISINOPRIL SCH MG: 5 TABLET ORAL at 08:11

## 2019-11-10 RX ADMIN — APIXABAN SCH MG: 2.5 TABLET, FILM COATED ORAL at 19:49

## 2019-11-10 RX ADMIN — GUAIFENESIN PRN ML: 100 SOLUTION ORAL at 08:08

## 2019-11-10 RX ADMIN — DOCUSATE SODIUM SCH MG: 100 CAPSULE, LIQUID FILLED ORAL at 08:08

## 2019-11-10 RX ADMIN — HYDROCODONE BITARTRATE AND ACETAMINOPHEN PRN TAB: 5; 325 TABLET ORAL at 21:53

## 2019-11-10 RX ADMIN — HYDROCODONE BITARTRATE AND ACETAMINOPHEN PRN TAB: 5; 325 TABLET ORAL at 18:07

## 2019-11-10 NOTE — PN
Progress Note


Date of Service: 11/10/19


Note: 


KATIE COON was visited. Nursing notes read and reviewed. I met with her two 

daughters. They told me that Katie lives with her grandson but they haven't 

seen her in 5 years. They would prefer not to have their mom return to her 

grandson's house. Am not sure how involved they are








Current Medications: 


 Active Medications











Generic Name Dose Route Start Last Admin





  Trade Name Freq  PRN Reason Stop Dose Admin


 


Acetaminophen  650 mg  11/05/19 11:44  





  Tylenol Tab*  PO   





  Q6H PRN   





  MILD PAIN or TEMP > 100.4   





     





     





     


 


Hydrocodone Bitart/Acetaminophen  1 tab  11/05/19 11:54  11/10/19 13:01





  Norco 5-325 Tab*  PO   1 tab





  Q4H PRN   Administration





  PAIN - MODERATE   





     





     





     


 


Hydrocodone Bitart/Acetaminophen  2 tab  11/05/19 11:55  11/09/19 22:32





  Norco 5-325 Tab*  PO   2 tab





  Q4H PRN   Administration





  PAIN - SEVERE   





     





     





     


 


Albuterol  2 puff  11/05/19 17:09  





  Ventolin Hfa Inhaler*  INH   





  Q6H PRN   





  SOB/WHEEZING   





     





     





     


 


Apixaban  2.5 mg  11/05/19 21:00  11/10/19 08:08





  Eliquis*  PO   2.5 mg





  BID DENIS   Administration





     





     





     





     


 


Docusate Sodium  100 mg  11/05/19 21:00  11/10/19 08:08





  Colace Cap*  PO   100 mg





  BID DENIS   Administration





     





     





     





     


 


Guaifenesin  5 ml  11/07/19 19:30  11/10/19 08:08





  Robitussin*  PO   5 ml





  Q6H PRN   Administration





  COUGH   





     





     





     


 


Lisinopril  2.5 mg  11/06/19 09:00  11/10/19 08:11





  Prinivil Tab*  PO   2.5 mg





  DAILY DENIS   Administration





     





     





     





     


 


Magnesium Hydroxide  30 ml  11/05/19 11:44  





  Milk Of Magnesia Liq*  PO   





  Q6H PRN   





  CONSTIPATION   





     





     





     


 


Polyethylene Glycol/Electrolytes  17 gm  11/05/19 11:55  11/06/19 12:42





  Miralax*  PO   17 gm





  DAILY PRN   Administration





  CONSTIPATION   





     





     





     


 


Senna  2 tab  11/05/19 11:44  11/07/19 20:44





  Senokot 8.6 Mg Tab*  PO   2 tab





  BEDTIME PRN   Administration





  CONSTIPATION   





     





     





     











Vital Signs: 


 Vital Signs











Temp Pulse Resp BP Pulse Ox


 


 97.9 F   80   18   142/77   95 


 


 11/10/19 04:39  11/10/19 04:39  11/10/19 13:01  11/10/19 04:39  11/10/19 08:00











Exam: 





GENERAL: A&O. No distress


LUNGS: Scattered ronchi


HEART: Regular rhythm


ABDOMEN: Soft


EXTREMITIES: Right hip wound C/D/I


NEUROLOGIC: Sensation intact. Moves all 4 extremities with 5/5 except RLE


Assessment/Plan: 





1. Right Hip Fracture, S/P Hemiarthroplasty: WBAT. PT/OT. Follow up with Dr. English


2. COPD: Albuterol HFA


3, DVT Prophylaxis: Eliquis


4. HTN: Lisinopril


5. Dementia: MOCA 8/30


6. Advance Directives: DNR


7. Cough: Robitussin 























11/10/19 13:35

## 2019-11-11 RX ADMIN — HYDROCODONE BITARTRATE AND ACETAMINOPHEN PRN TAB: 5; 325 TABLET ORAL at 17:01

## 2019-11-11 RX ADMIN — HYDROCODONE BITARTRATE AND ACETAMINOPHEN PRN TAB: 5; 325 TABLET ORAL at 10:09

## 2019-11-11 RX ADMIN — SENNOSIDES PRN TAB: 8.6 TABLET, FILM COATED ORAL at 20:30

## 2019-11-11 RX ADMIN — LISINOPRIL SCH MG: 5 TABLET ORAL at 09:07

## 2019-11-11 RX ADMIN — APIXABAN SCH MG: 2.5 TABLET, FILM COATED ORAL at 09:07

## 2019-11-11 RX ADMIN — HYDROCODONE BITARTRATE AND ACETAMINOPHEN PRN TAB: 5; 325 TABLET ORAL at 06:06

## 2019-11-11 RX ADMIN — HYDROCODONE BITARTRATE AND ACETAMINOPHEN PRN TAB: 5; 325 TABLET ORAL at 01:54

## 2019-11-11 RX ADMIN — DOCUSATE SODIUM SCH MG: 100 CAPSULE, LIQUID FILLED ORAL at 20:30

## 2019-11-11 RX ADMIN — APIXABAN SCH MG: 2.5 TABLET, FILM COATED ORAL at 20:30

## 2019-11-11 RX ADMIN — DOCUSATE SODIUM SCH MG: 100 CAPSULE, LIQUID FILLED ORAL at 09:07

## 2019-11-11 RX ADMIN — HYDROCODONE BITARTRATE AND ACETAMINOPHEN PRN TAB: 5; 325 TABLET ORAL at 23:12

## 2019-11-11 NOTE — PN
Progress Note


Date of Service: 11/11/19


Note: 


KATIE COON was visited. Nursing and therapy notes read and reviewed. Pt 

states she does not want to go home to grandson's but rather to [place] or 

her daughter's. Unclear if there is 24hr supervision available there.  No chest 

pain, shortness of breath or abdominal pain.  








Current Medications: 


 Active Medications











Generic Name Dose Route Start Last Admin





  Trade Name Freq  PRN Reason Stop Dose Admin


 


Acetaminophen  650 mg  11/05/19 11:44  





  Tylenol Tab*  PO   





  Q6H PRN   





  MILD PAIN or TEMP > 100.4   





     





     





     


 


Hydrocodone Bitart/Acetaminophen  1 tab  11/05/19 11:54  11/11/19 10:09





  Norco 5-325 Tab*  PO   1 tab





  Q4H PRN   Administration





  PAIN - MODERATE   





     





     





     


 


Hydrocodone Bitart/Acetaminophen  2 tab  11/05/19 11:55  11/11/19 01:54





  Norco 5-325 Tab*  PO   2 tab





  Q4H PRN   Administration





  PAIN - SEVERE   





     





     





     


 


Albuterol  2 puff  11/05/19 17:09  





  Ventolin Hfa Inhaler*  INH   





  Q6H PRN   





  SOB/WHEEZING   





     





     





     


 


Apixaban  2.5 mg  11/05/19 21:00  11/11/19 09:07





  Eliquis*  PO   2.5 mg





  BID DENIS   Administration





     





     





     





     


 


Docusate Sodium  100 mg  11/05/19 21:00  11/11/19 09:07





  Colace Cap*  PO   100 mg





  BID DENIS   Administration





     





     





     





     


 


Guaifenesin  5 ml  11/07/19 19:30  11/10/19 08:08





  Robitussin*  PO   5 ml





  Q6H PRN   Administration





  COUGH   





     





     





     


 


Lisinopril  2.5 mg  11/06/19 09:00  11/11/19 09:07





  Prinivil Tab*  PO   2.5 mg





  DAILY DENIS   Administration





     





     





     





     


 


Magnesium Hydroxide  30 ml  11/05/19 11:44  





  Milk Of Magnesia Liq*  PO   





  Q6H PRN   





  CONSTIPATION   





     





     





     


 


Polyethylene Glycol/Electrolytes  17 gm  11/05/19 11:55  11/06/19 12:42





  Miralax*  PO   17 gm





  DAILY PRN   Administration





  CONSTIPATION   





     





     





     


 


Senna  2 tab  11/05/19 11:44  11/07/19 20:44





  Senokot 8.6 Mg Tab*  PO   2 tab





  BEDTIME PRN   Administration





  CONSTIPATION   





     





     





     











Vital Signs: 


 Vital Signs











Temp Pulse Resp BP Pulse Ox


 


 98.8 F   63   16   124/72   100 


 


 11/11/19 06:06  11/11/19 06:06  11/11/19 12:09  11/11/19 06:06  11/11/19 06:06











Exam: 





GENERAL: No distress. Alert and appropriate


LUNGS: Scattered ronchi bilaterally


HEART: Regular rate and rhythm


ABDOMEN: Soft, + bowel sounds, non-tender, non-distended


EXTREMITIES: No edema


NEUROLOGIC: Motor 5/5 BLE except pain limited right hip and knee with normal 

sensation.








Assessment/Plan: 





1. Right Hip Fracture, S/P Hemiarthroplasty: WBAT. PT/OT. Follow up with Dr. English


2. COPD: Albuterol HFA


3, DVT Prophylaxis: Eliquis


4. HTN: Lisinopril


5. Dementia: MOCA 8/30


6. Advance Directives: DNR


7. Cough: Robitussin 


8. Dispo: Social work involved with determining family situation and d/c 

location.





11/11/19 13:02

## 2019-11-12 RX ADMIN — HYDROCODONE BITARTRATE AND ACETAMINOPHEN PRN TAB: 5; 325 TABLET ORAL at 13:17

## 2019-11-12 RX ADMIN — APIXABAN SCH MG: 2.5 TABLET, FILM COATED ORAL at 21:53

## 2019-11-12 RX ADMIN — LISINOPRIL SCH MG: 5 TABLET ORAL at 08:40

## 2019-11-12 RX ADMIN — HYDROCODONE BITARTRATE AND ACETAMINOPHEN PRN TAB: 5; 325 TABLET ORAL at 08:41

## 2019-11-12 RX ADMIN — ALBUTEROL SULFATE PRN PUFF: 90 AEROSOL, METERED RESPIRATORY (INHALATION) at 13:25

## 2019-11-12 RX ADMIN — APIXABAN SCH MG: 2.5 TABLET, FILM COATED ORAL at 08:40

## 2019-11-12 RX ADMIN — GUAIFENESIN PRN ML: 100 SOLUTION ORAL at 17:46

## 2019-11-12 RX ADMIN — DOCUSATE SODIUM SCH MG: 100 CAPSULE, LIQUID FILLED ORAL at 21:55

## 2019-11-12 RX ADMIN — DOCUSATE SODIUM SCH MG: 100 CAPSULE, LIQUID FILLED ORAL at 08:40

## 2019-11-12 RX ADMIN — ACETAMINOPHEN PRN MG: 325 TABLET ORAL at 17:47

## 2019-11-12 RX ADMIN — GUAIFENESIN PRN ML: 100 SOLUTION ORAL at 08:42

## 2019-11-12 NOTE — PN
Progress Note


Date of Service: 11/12/19


Note: 


KATIE COON was visited. Nursing and therapy notes read and reviewed. No 

chest pain, shortness of breath or abdominal pain.  Just sore at hip.  








Current Medications: 


 Active Medications











Generic Name Dose Route Start Last Admin





  Trade Name Freq  PRN Reason Stop Dose Admin


 


Acetaminophen  650 mg  11/05/19 11:44  





  Tylenol Tab*  PO   





  Q6H PRN   





  MILD PAIN or TEMP > 100.4   





     





     





     


 


Hydrocodone Bitart/Acetaminophen  1 tab  11/05/19 11:54  11/11/19 17:01





  Norco 5-325 Tab*  PO   1 tab





  Q4H PRN   Administration





  PAIN - MODERATE   





     





     





     


 


Hydrocodone Bitart/Acetaminophen  2 tab  11/05/19 11:55  11/12/19 08:41





  Norco 5-325 Tab*  PO   2 tab





  Q4H PRN   Administration





  PAIN - SEVERE   





     





     





     


 


Albuterol  2 puff  11/05/19 17:09  





  Ventolin Hfa Inhaler*  INH   





  Q6H PRN   





  SOB/WHEEZING   





     





     





     


 


Apixaban  2.5 mg  11/05/19 21:00  11/12/19 08:40





  Eliquis*  PO   2.5 mg





  BID DENIS   Administration





     





     





     





     


 


Docusate Sodium  100 mg  11/05/19 21:00  11/12/19 08:40





  Colace Cap*  PO   100 mg





  BID DENIS   Administration





     





     





     





     


 


Guaifenesin  5 ml  11/07/19 19:30  11/12/19 08:42





  Robitussin*  PO   5 ml





  Q6H PRN   Administration





  COUGH   





     





     





     


 


Lisinopril  2.5 mg  11/06/19 09:00  11/12/19 08:40





  Prinivil Tab*  PO   2.5 mg





  DAILY DENIS   Administration





     





     





     





     


 


Magnesium Hydroxide  30 ml  11/05/19 11:44  





  Milk Of Magnesia Liq*  PO   





  Q6H PRN   





  CONSTIPATION   





     





     





     


 


Polyethylene Glycol/Electrolytes  17 gm  11/05/19 11:55  11/06/19 12:42





  Miralax*  PO   17 gm





  DAILY PRN   Administration





  CONSTIPATION   





     





     





     


 


Senna  2 tab  11/05/19 11:44  11/11/19 20:30





  Senokot 8.6 Mg Tab*  PO   2 tab





  BEDTIME PRN   Administration





  CONSTIPATION   





     





     





     











Vital Signs: 


 Vital Signs











Temp Pulse Resp BP Pulse Ox


 


 98.0 F   80   16   144/79   96 


 


 11/12/19 06:24  11/12/19 06:24  11/12/19 08:41  11/12/19 06:24  11/12/19 06:24











Exam: 





GENERAL: No distress. Alert and appropriate


LUNGS: Scattered ronchi bilaterally


HEART: Regular rate and rhythm


ABDOMEN: Soft, + bowel sounds, non-tender, non-distended


EXTREMITIES: No edema


NEUROLOGIC: Motor 5/5 BLE except pain limited right hip and knee with normal 

sensation.


Assessment/Plan: 





1. Right Hip Fracture, S/P Hemiarthroplasty: WBAT. PT/OT. Follow up with Dr. English


2. COPD: Albuterol HFA


3, DVT Prophylaxis: Eliquis


4. HTN: Lisinopril


5. Dementia: MOCA 8/30


6. Advance Directives: DNR


7. Cough: Robitussin 


8. Dispo: Social work involved with determining family situation and d/c 

location.











11/12/19 09:17

## 2019-11-12 NOTE — PMRUTEAM
PMRU: Team Meeting


Current Status: 


 Physical Therapy: Current Status











Current Rolling Status         Supervision/Touching


 


Current Supine <-> Sit Status  Supervision/Touching


 


Current Sit <-> Stand Status   Supervision/Touching


 


Current Bed <-> Chair Status   Supervision/Touching


 


Transfer/Bed Mobility          None





Recommended Devices            


 


Current Picking Up Object      Supervision/Touching





Status                         


 


Current Car Transfer Status    Not attempted due to


 


Current Ambulation Assistance  Supervision/Touching





Status                         


 


Ambulation Assistive Device    None


 


Ambulation Conditions          Two or More Turns,Uneven Surfaces


 


Current Ambulation Distance    2x150'


 


Current Wheelchair Propulsion  Not Applicable





Ability Status                 


 


Current Stair Climbing Status  Supervision/Touching


 


Stair Climbing Assistive       Left Railing,Right Railing





Devices                        


 


Number of Stairs Climbed       2x5


 


Current Curb Assistance Status Supervision/Touching


 


Curb Assistive Devices         None


 


Objective Comments             Picking up object with use of reacher: 

supervision





 , cues for safety with RLE hip precautions





 2x5 steps with bilateral rails, step over step -





 supervision














 Occupational Therapy: Current Status











Current Upper Body Dressing    Setup or Clean-up Assist





Status                         


 


Current Lower Body Dressing    Partial/Moderate





Status                         


 


Lower Body Dressing Progress   cues/assist for use of AE, max v/c's to maintain





 hip precautions


 


Current Footwear Status        Dependent


 


Current Bathing Status         Partial/Moderate


 


Bathing Progress               assist to wash lower legs/feet


 


Current Grooming Status        Supervision/Touching


 


Current Toileting Status       Supervision/Touching


 


Current Toilet Transfer Status Supervision/Touching


 


Current Eating Status          Independent














 Nursing: Current Status











Skin Deviations [Forehead]     Abrasion


 


Skin Deviations [Buttocks]     Other


 


Skin Deviations [Upper Back]   Other


 


Skin Deviations [Right Hip]    Incision


 


Skin Deviation Description [   scab





Forehead]                      


 


Skin Deviation Description [   intact, no skinbreakdown noted





Buttocks]                      


 


Skin Deviation Description [   no skin breakdown noted





Upper Back]                    


 


Skin Deviation Description [   Open to air





Right Hip]                     


 


Bladder Current Status         supervision


 


Bowel Current Status           supervision


 


Nutrition Current Status       eats 100% of meal


 


Medication Current Status      continues to take percocet, pt continue needs for





 medication reinforcement














 Rec Therapy: Current Status











Summary of Assessment and      Recreation Therapy assessment complete and pt. is





Clinical Impression            aware of services.  Pt. has been interactive and





 engaged in leisure visits and pet therapy.


 


Treatment Goals                Pt. will engage in leisure activities while on 

the





 unit.


 


Treatment Plan                 Provide recreation therapy services and encourage





 involvement.














 Nutrition: Current Status











Monitoring                     pt appears to be feisty and somewhat particular 

re





 : meals (eg: doesn't like anyone "bothering" her





 during meals to offer her items or give her med or





 ask her to walk). She and family member confirm





 that pt has always been thin, so no reported wt





 loss or lack of appetite.  Pt denies difficulty





 chewing or swallowing.  Skin is intact. Weekly





 labs anticipated 11/13, but thus far unremarkable.





 Pt made aware of alternate items that are





 available if she needs them or if she does not





 care for the two entrees offered at [facility].  Note





 plan for d/c to the [facility].  She appears to





 be eating adequately, appears older than her





 actual age, but does not exhibit any s/sx wasting/





 malnutrition.  Will stay involved as needed.

















Goals: 


 Physical Therapy:  Goals











Goals to Be Accomplished in (  5-7





Days)                          


 


Goal: Rolling Assistance       Independent


 


Goal Supine <-> Sit Status     Independent


 


Goal Sit <-> Stand Status      Supervision


 


Goal Bed <-> Chair Status      Independent


 


Transfer/Bed Mobility          None





Recommended Devices            


 


Goal: Picking Up Object        Supervision


 


Goal: Car Transfer Status      Supervision


 


Goal: Ambulation Assistance    Supervision


 


Ambulation Assistive Devices   None


 


Ambulation Distance (ft)       150


 


Goal: Wheelchair Propulsion    Not Applicable





Ability                        


 


Goal: Stairs Assistance        Supervision/Touching


 


Stairs Recommended Devices     Two Rails


 


Number of Stairs               2x5


 


Goal: Curb Assistance          Supervision/Touching


 


Goal: Home Exercise Program    Supervision/Touching





Assistance                     














 Occupational Therapy: Goals











Goals to be Completed in (Days 7-10





)                              


 


Goal Upper Body Dressing       Setup or Clean-up Assist





Routine                        


 


Goal Lower Body Dressing       Partial/Moderate





Routine                        


 


Goal Footwear Status           Substantial/Maximal


 


Goal Bathing Routine (OT)      Partial/Moderate


 


Goal Grooming Routine          Setup or Clean-up Assist


 


Goal Toilet Hygiene and        Supervision/Touching





Clothing Management Routine    


 


Goal Toilet Transfer Routine   Supervision/Touching


 


Goal Functional Transfers for  Supervision/Touching





ADL                            


 


Goal Feeding Routine           Independent


 


Goal Light Housekeeping Tasks  Partial/Moderate














 Nutrition: Goals











Intervention Goals             1.  adequate intake to support stable wt,





 hydration, and lean body mass





 2.  maintain serum electrolytes WNL





 3.  regulation of bowel pattern; no c/o





 constipation (or diarrhea)





 














 Nursing: Goals











Bladder Goal                   independent


 


Bowel Goal                     independent


 


Nutrition Goal                 eats 100% of meal


 


Medication Goal                supervision

















Care Plan: 


 Care Plan





ADL's - Improve/Maintain                Start:  11/05/19 11:58              


Freq:   DAILY@0700,1900                 Status: Active      Target: 11/06/19


Protocol:                                                                   








Activity Type Activity Date Activity User E-Sign Co-Sign Detail





Recorded Client Recorded Date Recorded By   


 


Document 11/12/19 10:32 [id number]   





[id number]-C09 11/12/19 10:32 [id number]   














  11/12/19





  10:32


 


PMRU Outcome: ADL's/ADL Transfers 


 


Orders/Interventions Occupational





 Therapy





 Evaluation &





 Treatment





 Communication





 Tool in Patient





 Room


 


Device Yes


 


Address Deficits Secondary To: right hip





 hemiarthroplast





 y


 


Patient to receive OT 5x/wk for  Therex





min/day Self Care





 Management





 Group Therapy


 


UE/LE ADL's with Assist Yes:





 supervision/





 touching A


 


ADL Transfers with Assist Yes:





 supervision/





 touching A


 


Toileting: Transfers,Clothing Management Yes:





,Hygeine w/Assist supervision/





 touching A


 


Light Kitchen/Laundry w/Assist Yes: partial/





 modA


 


Progression Toward Outcome/Goals Not Progressing


 


Lack of Progression Comment Pt participated





 fair in





 treatment





 session, c/o





 pain throughout





 at Right hip





 incision, RN





 notified. Pt





 educated each





 session on use





 of AE,





 continues to





 require





 reminders on





 how to use and





 assistance when





 using in order





 to maintain





 precautions.








Communication-Improve/Maintain          Start:  11/05/19 12:29              


Freq:   DAILY@0700,1900                 Status: Complete    Target: 11/12/19


Protocol:                                                                   








Activity Type Activity Date Activity User E-Sign Co-Sign Detail





Recorded Client Recorded Date Recorded By   


 


Document 11/10/19 07:00 [id number]   





[id number]-C06 11/10/19 07:13 [id number]   














  11/10/19





  07:00


 


PMRU Outcome: Communication/Cognitive 





Status 


 


Current Communication Outcome/Goals Use Comm Tools/





 Devices





 Makes Needs





 Known





 Effectively


 


Progression Toward Outcomes/Goals Goals Met


 


Outcome/Goals Met Makes Needs





 Known





 Effectively








DVT Prophylaxis- Improve/Maintain       Start:  11/05/19 12:29              


Freq:   DAILY@0700,1900                 Status: Active      Target: 11/15/19


Protocol:                                                                   








Activity Type Activity Date Activity User E-Sign Co-Sign Detail





Recorded Client Recorded Date Recorded By   


 


Document 11/12/19 07:00 [id number]   





PMRU-M09 11/12/19 08:59 [id number]   














  11/12/19





  07:00


 


PMRU Outcome: DVT Prophylaxis 


 


Current DVT Outcome/Goals Remains Free of





 DVT





 Complies with





 DVT Prophylaxis





 /Treatment





 Demonstrates





 Knowledge of





 DVT Prevention/





 Treatment





 TEDS Stockings





 on Every AM,





 Off at HS


 


Progression Toward Outcome/Goals Progressing








Discharge Planning - Improve/Maintain   Start:  11/05/19 12:29              


Freq:   DAILY@0700,1900                 Status: Active      Target: 11/14/19


Protocol:                                                                   








Activity Type Activity Date Activity User E-Sign Co-Sign Detail





Recorded Client Recorded Date Recorded By   


 


Document 11/12/19 07:00 [id number]   





[id number]-M09 11/12/19 08:59 [id number] 11/12/19





  07:00


 


PMRU Outcome: Discharge Planning 


 


Update Patient Family No: family not





 present


 


Current Discharge Planning Outcome/Goals Demonstrates





 Understanding





 of Discharge





 Plan





 Homecare





 Referral - See





 Comment


 


Progression Toward Outcome/Goals Progressing








Education-Improve/Maintain              Start:  11/05/19 12:29              


Freq:   DAILY@0700,1900                 Status: Active      Target: 11/15/19


Protocol:                                                                   








Activity Type Activity Date Activity User E-Sign Co-Sign Detail





Recorded Client Recorded Date Recorded By   


 


Document 11/12/19 07:00 [id number]   





PMRU-M09 11/12/19 08:59 [id number] 11/12/19





  07:00


 


PMRU Outcome: Education 


 


Current Education Outcome/Goals Demonstrate/





 Verbalize





 Understanding





 of Written





 Discharge





 Instructions





 Demonstrates





 Skills





 Encourage





 Questions


 


Progression Toward Outcome/Goals Progressing








/GI-Improve/Maintain                  Start:  11/05/19 12:29              


Freq:   DAILY@0700,1900                 Status: Active      Target: 11/15/19


Protocol:                                                                   








Activity Type Activity Date Activity User E-Sign Co-Sign Detail





Recorded Client Recorded Date Recorded By   


 


Document 11/12/19 07:00 [id number]   





PMRU-M09 11/12/19 08:59 [id number] 11/12/19





  07:00


 


PMRU Outcome: Genitourinary/ 





Gastrointestinal 


 


Current Gastrointestinal Outcome/Goals Maintain/





 Achieve Bowel





 Regularity in





 Accordance with





 Pt's Baseline





 Prevent





 Constipation





 Laxatives as





 Ordered


 


Progression Toward Outcome/Goals Progressing


 


Current Genitourinary Outcome/Goals Maintain/





 Achieve Urinary





 Continence





 Maintain/





 Achieve





 Adequate





 Urinary Output





 Remain Free of





 Hospital-





 Acquired UTI


 


Progression Toward Outcome/Goals Progressing








Medication Administration               Start:  11/05/19 12:29              


Freq:   DAILY@0700,1900                 Status: Active      Target: 11/15/19


Protocol:                                                                   








Activity Type Activity Date Activity User E-Sign Co-Sign Detail





Recorded Client Recorded Date Recorded By   


 


Document 11/12/19 07:00 [id number]   





PMRU-M09 11/12/19 08:59 [id number]   














  11/12/19





  07:00


 


PMRU Outcome: Medication Administration 


 


Assess Patient Knowledge/Teach Med Yes





Education for all Meds 


 


Current Med Admin Outcome/Goals Family/





 Caregiver





 Administer





 Medications at





 Home





 Demonstrates





 Understanding


 


Progression Towards Outcome/Goals Progressing


 


Is Patient Going Home on Lovenox? No








Mobility- Improve/Maintain              Start:  11/06/19 23:47              


Freq:   DAILY@0700,1900                 Status: Active      Target: 11/13/19


Protocol:                                                                   








Activity Type Activity Date Activity User E-Sign Co-Sign Detail





Recorded Client Recorded Date Recorded By   


 


Document 11/12/19 11:48 [id number]   





PMRU-M07 11/12/19 11:48 [id number]   














  11/12/19





  11:48


 


PMRU Outcome: Mobility 


 


Physical Therapy Evaluation and Yes





Treatment 


 


Activity OOB with Assistance Yes


 


WBAT Yes


 


NWB No


 


TTWB No


 


Device Yes


 


Assistance Yes


 


Patient to be seen 5x/wk for  min/ Therex





day for: Mobility





 Training





 Gait Training





 Balance





 Other


 


Other Therapy Comment Family training





 , discharge





 planning


 


Current Mobility Outcome/Goals Improve





 Mobility Status


 


Other Mobility Outcome/Goals Pt continues to





 require VCs





 for maintenance





 of RLE total





 hip precautions





 , however,





 improving with





 functional





 endurance and





 dynamic





 standing





 balance with





 and without UE





 support. Pt





 demonstrates





 improvements





 with safety and





 B hand





 placement





 during





 transfers





 without use of





 AD. Pt now





 completing





 transfers and





 ambulation





 wihtout AD at





 supervision





 level, and is





 making good





 progress toward





 LTGS.  Pt will





 continue to





 benefit from





 continued





 skilled PT to





 increase





 independence,





 reduce fall





 risk and





 promote safe DC





 home.


 


Progression Toward Outcome/Goals Progressing


 


Bed Mobility Yes:





 independent


 


Transfers Yes:





 supervision





 with RW


 


Gait x ft Yes: 150'





 supervision





 with RW


 


W/C Mobility x ft No


 


Up/Down Stairs Yes: 4 with 1





 rail





 supervision


 


With HEP Yes:





 supervision


 


Goal Comment Goals set at





 supervision





 level due to





 having R total





 hip precautions





 and baseline





 dementia.








Pain/Comfort- Improve/Maintain          Start:  11/05/19 12:29              


Freq:   DAILY@0700,1900                 Status: Active      Target: 11/15/19


Protocol:                                                                   








Activity Type Activity Date Activity User E-Sign Co-Sign Detail





Recorded Client Recorded Date Recorded By   


 


Document 11/12/19 07:00 [id number]   





PMRU-M09 11/12/19 08:59 [id number]   














  11/12/19





  07:00


 


PMRU Outcome: Pain/Comfort 


 


Current Pain/Comfort Outcome/Goals Demonstrates





 Knowledge and





 Use of





 Available





 Comfort





 Measures





 Achieves





 Acceptable





 Comfort/Pain





 Level as





 Determined by





 Patient/Condit





 Maintain





 Comfort Level





 Allowing





 Patient to





 Fully





 Participate in





 Rehab


 


Progression Toward Outcome/Goals Progressing








Rec Therapy- Improve/Maintain           Start:  11/05/19 16:44              


Freq:   DAILY@0700,1900                 Status: Active      Target: 11/08/19


Protocol:                                                                   








Activity Type Activity Date Activity User E-Sign Co-Sign Detail





Recorded Client Recorded Date Recorded By   


 


Document 11/07/19 15:31 [id number]   





BSU-C04 11/07/19 15:32 [id number]   














  11/07/19





  15:31


 


PMRU Outcome: Recreation Therapy 


 


Current Rec Ther Outcome/Goals Complete Rec





 Therapy





 Assessment





 Meet with





 Patient





 Regularly for





 Support





 Encourage





 Leisure





 Involvement


 


Progression Toward Outcome/Goals Progressing


 


Lack of Progression Comment pt. has been





 engaged in





 leisure and pet





 visits.


 


Outcome/Goals Met Complete Rec





 Therapy





 Assessment








Safety- Improve/Maintain                Start:  11/05/19 10:14              


Freq:   DAILY@0700,1900                 Status: Active      Target: 11/15/19


Protocol:                                                                   








Activity Type Activity Date Activity User E-Sign Co-Sign Detail





Recorded Client Recorded Date Recorded By   


 


Document 11/12/19 07:00 [id number]   





PMRU-M09 11/12/19 08:59 [id number]   














  11/12/19





  07:00


 


PMRU Outcome: Safety 


 


Current Safety Outcome/Goals Remain Free of





 Injury or Harm





 Cooperates with





 Safety





 Measures for





 Least





 Restrictive





 Environment





 Prevent Falls/





 Injury


 


Progression Toward Outcome/Goals Progressing








Skin- Improve/Maintain                  Start:  11/05/19 12:29              


Freq:   DAILY@0700,1900                 Status: Active      Target: 11/15/19


Protocol:                                                                   








Activity Type Activity Date Activity User E-Sign Co-Sign Detail





Recorded Client Recorded Date Recorded By   


 


Document 11/12/19 07:00 [id number]   





PMRU-M09 11/12/19 08:59 [id number]   














  11/12/19





  07:00


 


PMRU Outcome: Skin 


 


Skin Risk Level No Risk


 


Current Skin Outcome/Goals Maintain/





 Improve Skin





 Integrity





 Free from





 Pressure Injury





 Surgical





 Incisions





 Healing


 


Progression Toward Outcome/Goals Progressing

















- Interdisciplinary Staff Present


/Social Work Staff Present: Tasneem Lujan


Nursing Staff Present: Jana Dueñas


OT Staff Present: Shanta Doty


PT Staff Present: Farhana Delatorre


Rec Therapy Staff Present: Yina Pulido


Medicine Note: 








Length of Stay:  [1 day]





Anticipated Discharge Destination: [facility]





Tentative Discharge Date:  [11/13/19]





Discharged to:  [[facility]]

## 2019-11-13 VITALS — DIASTOLIC BLOOD PRESSURE: 74 MMHG | SYSTOLIC BLOOD PRESSURE: 136 MMHG

## 2019-11-13 LAB
ALBUMIN SERPL BCG-MCNC: 3.3 G/DL (ref 3.2–5.2)
ALBUMIN/GLOB SERPL: 1.1 {RATIO} (ref 1–3)
ALP SERPL-CCNC: 69 U/L (ref 34–104)
ALT SERPL W P-5'-P-CCNC: 8 U/L (ref 7–52)
ANION GAP SERPL CALC-SCNC: 3 MMOL/L (ref 2–11)
AST SERPL-CCNC: 13 U/L (ref 13–39)
BASOPHILS # BLD AUTO: 0 10^3/UL (ref 0–0.2)
BUN SERPL-MCNC: 19 MG/DL (ref 6–24)
BUN/CREAT SERPL: 21.6 (ref 8–20)
CALCIUM SERPL-MCNC: 9.4 MG/DL (ref 8.6–10.3)
CHLORIDE SERPL-SCNC: 105 MMOL/L (ref 101–111)
EOSINOPHIL # BLD AUTO: 0.1 10^3/UL (ref 0–0.6)
GLOBULIN SER CALC-MCNC: 3.1 G/DL (ref 2–4)
GLUCOSE SERPL-MCNC: 86 MG/DL (ref 70–100)
HCO3 SERPL-SCNC: 30 MMOL/L (ref 22–32)
HCT VFR BLD AUTO: 31 % (ref 35–47)
HGB BLD-MCNC: 10.4 G/DL (ref 12–16)
LYMPHOCYTES # BLD AUTO: 1.2 10^3/UL (ref 1–4.8)
MCH RBC QN AUTO: 29 PG (ref 27–31)
MCHC RBC AUTO-ENTMCNC: 34 G/DL (ref 31–36)
MCV RBC AUTO: 88 FL (ref 80–97)
MONOCYTES # BLD AUTO: 0.4 10^3/UL (ref 0–0.8)
NEUTROPHILS # BLD AUTO: 4.4 10^3/UL (ref 1.5–7.7)
NRBC # BLD AUTO: 0 10^3/UL
NRBC BLD QL AUTO: 0
PLATELET # BLD AUTO: 538 10^3/UL (ref 150–450)
POTASSIUM SERPL-SCNC: 4.6 MMOL/L (ref 3.5–5)
PROT SERPL-MCNC: 6.4 G/DL (ref 6.4–8.9)
RBC # BLD AUTO: 3.55 10^6 /UL (ref 3.7–4.87)
SODIUM SERPL-SCNC: 138 MMOL/L (ref 135–145)
WBC # BLD AUTO: 6.2 10^3/UL (ref 3.5–10.8)

## 2019-11-13 RX ADMIN — DOCUSATE SODIUM SCH: 100 CAPSULE, LIQUID FILLED ORAL at 09:13

## 2019-11-13 RX ADMIN — GUAIFENESIN PRN ML: 100 SOLUTION ORAL at 09:06

## 2019-11-13 RX ADMIN — ALBUTEROL SULFATE PRN PUFF: 90 AEROSOL, METERED RESPIRATORY (INHALATION) at 09:06

## 2019-11-13 RX ADMIN — LISINOPRIL SCH MG: 5 TABLET ORAL at 09:05

## 2019-11-13 RX ADMIN — APIXABAN SCH MG: 2.5 TABLET, FILM COATED ORAL at 09:05

## 2019-11-13 RX ADMIN — ACETAMINOPHEN PRN MG: 325 TABLET ORAL at 11:45

## 2019-11-13 RX ADMIN — HYDROCODONE BITARTRATE AND ACETAMINOPHEN PRN TAB: 5; 325 TABLET ORAL at 09:07

## 2019-11-13 NOTE — DS
CC:  Dr. Kenney, [facility]; Dr. English *

 

DATE OF ADMISSION:  11/05/2019.

 

DATE OF DISCHARGE:  11/13/2019.

 

REASON FOR ADMISSION:  Right hip fracture.

 

PRIMARY CARE PHYSICIAN:  Dr. Kenney at [facility].

 

ORTHOPEDIC SURGEON:  Dr. English.

 

HISTORY OF PRESENT ILLNESS:  For full details of her acute hospitalization 
leading up to her admission, please see the note dictated by Dr. Alejandre on 11/
05/2019. Briefly, she sustained a right hip fracture and was taken to the 
operating room on 11/02/2019 by Dr. English for a right hip hemiarthroplasty.  
She was started on Eliquis for DVT prophylaxis for 30 days, Lisinopril for 
hypertension, and Albuterol as needed for COPD.

 

REHABILITATION COURSE:  During her time on the [facility], she has remained medically 
stable.  She has had a cough and has used Robitussin on an as needed basis with 
good relief.  Her pain has been controlled using acetaminophen and Norco on an 
as needed basis.  Her bowels have been regulated using Colace, Senna, and as 
needed MiraLax.  She has been noted to have dementia and scored 8 out of 30 on 
the MOCA. She participated well with physical therapy and at the time of 
discharge should have supervision for bed mobility, transfers with or without 
an assistive device, and ambulating 150 feet or more with or without an 
assistive device.  She has a rolling walker.  She can also climb stairs with 
rails with supervision.  She has a printed home exercise program which she was 
given a copy of.  She has had difficulty remembering consistently all three of 
her hip precautions which is a primary reason why she needs supervision at 
discharge.  She also participated well with occupational therapy during her 
stay.  She is independent eating.  She requires a minimum amount of assistance 
for bathing, especially for her lower legs and feet so that she can maintain 
her precautions.  She needs cues for tub transfers.  For dressing her upper body
, she requires supervision and set up.  For lower body dressing, she requires 
moderate to maximum assistance.  For foot wear, she requires moderate to 
maximum assistance using adaptive equipment.  She needs supervision and 
guidance also with using adaptive equipment.  She can toilet with supervision.  
She needs assistance for home management and cooking.  Once again, she has 
difficulty consistently remembering and demonstrating ability to maintain her 
hip precautions.  Although she had been living with a grandson prior to 
admission, her daughters have become involved with her care and the decision 
was made for her to go to [facility] at [facility] for assisted living where 
she could get 24 hour supervision and assistance as needed.  The patient agreed 
to this plan as well.  On 11/13/2019, her staples were removed and Steri-Strips 
were placed.

 

DISCHARGE MEDICATIONS:

1.  Acetaminophen 650 mg q.6 hours prn mild pain.

2.  Albuterol meter dose inhaler two puffs q.6 hours prn shortness of breath or 
wheezing.

3.  Eliquis 2.5 mg b.i.d. until gone to prevent DVT.

4.  Colace 100 mg b.i.d.

5.  Guaifenesin 5 ml q.6 hours prn cough.

6.  Norco 5/325 one to two tablets q.4 hours prn moderate pain, maximum daily 
dose of 7, wean off as tolerated.

7.  Lisinopril 2.5 mg daily.

8.  MiraLax 17 gm daily prn constipation.

9.  Senna two tablets at bedtime prn constipation.

 

CONDITION ON DISCHARGE:  Fair.

 

DISCHARGE DISPOSITION:  [facility].

 

FOLLOW-UP:

1.  She is to follow-up with her primary care provider, Dr. Kenney, at [facility] in one to two weeks.

2.  Follow-up with Dr. English within one to two weeks.

 

DISCHARGE DIAGNOSES:

1.  Right hip fracture, status post hemiarthroplasty.

2.  COPD.

3.  Hypertension.

4.  Dementia.

 

 [id number]/CPS #: [id number]

RENE

## 2019-11-13 NOTE — PN
Progress Note


Date of Service: 11/13/19


Note: 


KATIE COON was visited. Nursing and therapy notes read and reviewed. Ready 

for d/c today to [facility].  No chest pain, shortness of breath or 

abdominal pain.








Current Medications: 


 Active Medications











Generic Name Dose Route Start Last Admin





  Trade Name Freq  PRN Reason Stop Dose Admin


 


Acetaminophen  650 mg  11/05/19 11:44  11/12/19 17:47





  Tylenol Tab*  PO   650 mg





  Q6H PRN   Administration





  MILD PAIN or TEMP > 100.4   





     





     





     


 


Hydrocodone Bitart/Acetaminophen  1 tab  11/05/19 11:54  11/11/19 17:01





  Norco 5-325 Tab*  PO   1 tab





  Q4H PRN   Administration





  PAIN - MODERATE   





     





     





     


 


Hydrocodone Bitart/Acetaminophen  2 tab  11/05/19 11:55  11/12/19 13:17





  Norco 5-325 Tab*  PO   2 tab





  Q4H PRN   Administration





  PAIN - SEVERE   





     





     





     


 


Albuterol  2 puff  11/05/19 17:09  11/12/19 13:25





  Ventolin Hfa Inhaler*  INH   2 puff





  Q6H PRN   Administration





  SOB/WHEEZING   





     





     





     


 


Apixaban  2.5 mg  11/05/19 21:00  11/12/19 21:53





  Eliquis*  PO   2.5 mg





  BID DENIS   Administration





     





     





     





     


 


Docusate Sodium  100 mg  11/05/19 21:00  11/12/19 21:55





  Colace Cap*  PO   100 mg





  BID DENIS   Administration





     





     





     





     


 


Guaifenesin  5 ml  11/07/19 19:30  11/12/19 17:46





  Robitussin*  PO   5 ml





  Q6H PRN   Administration





  COUGH   





     





     





     


 


Lisinopril  2.5 mg  11/06/19 09:00  11/12/19 08:40





  Prinivil Tab*  PO   2.5 mg





  DAILY DENIS   Administration





     





     





     





     


 


Magnesium Hydroxide  30 ml  11/05/19 11:44  





  Milk Of Magnesia Liq*  PO   





  Q6H PRN   





  CONSTIPATION   





     





     





     


 


Polyethylene Glycol/Electrolytes  17 gm  11/05/19 11:55  11/06/19 12:42





  Miralax*  PO   17 gm





  DAILY PRN   Administration





  CONSTIPATION   





     





     





     


 


Senna  2 tab  11/05/19 11:44  11/11/19 20:30





  Senokot 8.6 Mg Tab*  PO   2 tab





  BEDTIME PRN   Administration





  CONSTIPATION   





     





     





     











Vital Signs: 


 Vital Signs











Temp Pulse Resp BP Pulse Ox


 


 98.2 F   86   14   136/74   98 


 


 11/13/19 04:36  11/13/19 04:36  11/13/19 04:36  11/13/19 04:36  11/13/19 04:36











Lab Results: 


 Laboratory Results - last 24 hr











  11/13/19 11/13/19





  05:25 05:25


 


WBC  6.2 


 


RBC  3.55 L 


 


Hgb  10.4 L 


 


Hct  31 L 


 


MCV  88 


 


MCH  29 


 


MCHC  34 


 


RDW  15 


 


Plt Count  538 H 


 


MPV  6.3 L 


 


Neut % (Auto)  70.6 


 


Lymph % (Auto)  19.5 


 


Mono % (Auto)  7.1 


 


Eos % (Auto)  2.1 


 


Baso % (Auto)  0.7 


 


Absolute Neuts (auto)  4.4 


 


Absolute Lymphs (auto)  1.2 


 


Absolute Monos (auto)  0.4 


 


Absolute Eos (auto)  0.1 


 


Absolute Basos (auto)  0.0 


 


Absolute Nucleated RBC  0.0 


 


Nucleated RBC %  0.0 


 


Sodium   138


 


Potassium   4.6


 


Chloride   105


 


Carbon Dioxide   30


 


Anion Gap   3


 


BUN   19


 


Creatinine   0.88


 


Est GFR ( Amer)   75.2


 


Est GFR (Non-Af Amer)   62.1


 


BUN/Creatinine Ratio   21.6 H


 


Glucose   86


 


Calcium   9.4


 


Total Bilirubin   0.30


 


AST   13


 


ALT   8


 


Alkaline Phosphatase   69


 


Total Protein   6.4


 


Albumin   3.3


 


Globulin   3.1


 


Albumin/Globulin Ratio   1.1











Exam: 





GENERAL: No distress. Alert and appropriate


LUNGS: clear to auscultation bilaterally


HEART: Regular rate and rhythm


ABDOMEN: Soft, + bowel sounds, non-tender, non-distended


EXTREMITIES: No edema


NEUROLOGIC: Motor 5/5 BLE except pain limited right hip and knee with normal 

sensation.


SKIN: incsion intact.





PROCEDURE: Staples removed and steristrips placed. No complications.








Assessment/Plan: 





1. Right Hip Fracture, S/P Hemiarthroplasty: WBAT. Staples removed 11/13/19. 

Follow up with Dr. English


2. COPD: Albuterol HFA


3, DVT Prophylaxis: Eliquis


4. HTN: Lisinopril


5. Dementia: MOCA 8/30


6. Advance Directives: DNR


7. Cough: Robitussin prn


8. Dispo: D/c today to assisted living. 








11/13/19 09:06